# Patient Record
Sex: FEMALE | Race: WHITE | URBAN - METROPOLITAN AREA
[De-identification: names, ages, dates, MRNs, and addresses within clinical notes are randomized per-mention and may not be internally consistent; named-entity substitution may affect disease eponyms.]

---

## 2017-04-01 ENCOUNTER — OFFICE VISIT (OUTPATIENT)
Dept: URGENT CARE | Facility: URGENT CARE | Age: 82
End: 2017-04-01
Payer: MEDICARE

## 2017-04-01 VITALS
TEMPERATURE: 98.4 F | BODY MASS INDEX: 20.72 KG/M2 | SYSTOLIC BLOOD PRESSURE: 120 MMHG | HEART RATE: 84 BPM | DIASTOLIC BLOOD PRESSURE: 60 MMHG | OXYGEN SATURATION: 95 % | HEIGHT: 67 IN | WEIGHT: 132 LBS

## 2017-04-01 DIAGNOSIS — L03.116 CELLULITIS OF LEFT LEG: Primary | ICD-10-CM

## 2017-04-01 PROCEDURE — 99213 OFFICE O/P EST LOW 20 MIN: CPT | Performed by: FAMILY MEDICINE

## 2017-04-01 RX ORDER — CEPHALEXIN 500 MG/1
500 CAPSULE ORAL 3 TIMES DAILY
Qty: 30 CAPSULE | Refills: 0 | Status: SHIPPED | OUTPATIENT
Start: 2017-04-01 | End: 2017-09-16

## 2017-04-01 NOTE — MR AVS SNAPSHOT
"              After Visit Summary   2017    Ena Rubio    MRN: [id number]           Patient Information     Date Of Birth          1935        Visit Information        Provider Department      2017 7:00 PM Trevor Mock MD [facility]        Today's Diagnoses     Cellulitis of left leg    -  1       Follow-ups after your visit        Who to contact     If you have questions or need follow up information about today's clinic visit or your schedule please contact [facility] directly at [phone].  Normal or non-critical lab and imaging results will be communicated to you by MyChart, letter or phone within 4 business days after the clinic has received the results. If you do not hear from us within 7 days, please contact the clinic through [employer] or phone. If you have a critical or abnormal lab result, we will notify you by phone as soon as possible.  Submit refill requests through [employer] or call your pharmacy and they will forward the refill request to us. Please allow 3 business days for your refill to be completed.          Additional Information About Your Visit        MyChart Information     [employer] lets you send messages to your doctor, view your test results, renew your prescriptions, schedule appointments and more. To sign up, go to [email or web address] . Click on \"Log in\" on the left side of the screen, which will take you to the Welcome page. Then click on \"Sign up Now\" on the right side of the page.     You will be asked to enter the access code listed below, as well as some personal information. Please follow the directions to create your username and password.     Your access code is: [email or web address]  Expires: 2017  9:57 AM     Your access code will  in 90 days. If you need help or a new code, please call your [place] clinic or [phone].        Care EveryWhere ID     This is your Care EveryWhere ID. This " "could be used by other organizations to access your [place] medical records  [id number]        Your Vitals Were     Pulse Temperature Height Pulse Oximetry BMI (Body Mass Index)       84 98.4  F (36.9  C) 5' 7\" (1.702 m) 95% 20.67 kg/m2        Blood Pressure from Last 3 Encounters:   04/01/17 120/60   02/02/15 118/72   03/14/14 146/72    Weight from Last 3 Encounters:   04/01/17 132 lb (59.9 kg)   02/02/15 150 lb (68 kg)   03/14/14 156 lb 14.4 oz (71.2 kg)              Today, you had the following     No orders found for display         Today's Medication Changes          These changes are accurate as of: 4/1/17 11:59 PM.  If you have any questions, ask your nurse or doctor.               Start taking these medicines.        Dose/Directions    cephALEXin 500 MG capsule   Commonly known as:  KEFLEX   Used for:  Cellulitis of left leg   Started by:  Trevor Mock MD        Dose:  500 mg   Take 1 capsule (500 mg) by mouth 3 times daily   Quantity:  30 capsule   Refills:  0            Where to get your medicines      These medications were sent to [id number] - [facility] [address] AT [facility] [address]  [address], [facility] [address]    Hours:  24-hours Phone:  [phone]     cephALEXin 500 MG capsule                Primary Care Provider Office Phone # Fax #    Jermaine Guzman [phone]       [facility] [address]  [facility] [address]        Thank you!     Thank you for choosing [place] [facility]  for your care. Our goal is always to provide you with excellent care. Hearing back from our patients is one way we can continue to improve our services. Please take a few minutes to complete the written survey that you may receive in the mail after your visit with us. Thank you!             Your Updated Medication List - Protect others around you: Learn how to safely use, store and throw away your medicines " at www.disposemymeds.org.          This list is accurate as of: 4/1/17 11:59 PM.  Always use your most recent med list.                   Brand Name Dispense Instructions for use    calcium carbonate 600 MG tablet   Generic drug:  calcium      Take 1 tablet by mouth 2 times daily.       cephALEXin 500 MG capsule    KEFLEX    30 capsule    Take 1 capsule (500 mg) by mouth 3 times daily       diclofenac 1 % Gel topical gel    VOLTAREN    100 g    Apply 2-4 grams  to L shoulder  four times daily using enclosed dosing card.       fish oil-omega-3 fatty acids 1000 MG capsule      Take 2 g by mouth daily.       FLEXITOL HEEL BALM Oint     1 Bottle    Externally apply 1 dose topically daily.       gabapentin 600 MG tablet    NEURONTIN     Take 600 mg by mouth 3 times daily.       lisinopril 10 MG tablet    PRINIVIL/ZESTRIL     Take 10 mg by mouth daily.       methadone 5 MG tablet    DOLOPHINE     Take 5 mg by mouth every 6 hours.       mirtazapine 15 MG tablet    REMERON     Take 15 mg by mouth At Bedtime.       morphine sulfate (high concentrate) 20 MG/ML concentrated solution    ROXANOL-CONCENTRATED     Take 1 mL (20 mg) by mouth every 4 hours as needed for moderate to severe pain or other (for breakthrough pain - per [facility] Only)       multivitamin per tablet      Take 1 tablet by mouth daily.       omeprazole 20 MG CR capsule    priLOSEC     Take 20 mg by mouth daily.       oxazepam 10 MG capsule    SERAX     Take 10 mg by mouth nightly as needed.       PRESERVISION AREDS PO      Take  by mouth 2 times daily.       simvastatin 40 MG tablet    ZOCOR     Take 0.5 tablets by mouth at bedtime.       vitamin D 2000 UNITS tablet      Take 1 tablet by mouth daily.

## 2017-04-02 NOTE — NURSING NOTE
"Chief Complaint   Patient presents with     Infection     Pt c/o possible infection in a cut on her left shin X 3 days.     Urgent Care       Initial /60  Pulse 84  Temp 98.4  F (36.9  C)  Ht 5' 7\" (1.702 m)  Wt 132 lb (59.9 kg)  SpO2 95%  BMI 20.67 kg/m2 Estimated body mass index is 20.67 kg/(m^2) as calculated from the following:    Height as of this encounter: 5' 7\" (1.702 m).    Weight as of this encounter: 132 lb (59.9 kg).  Medication Reconciliation: complete    "

## 2017-04-05 NOTE — PROGRESS NOTES
SUBJECTIVE:   Chief Complaint   Patient presents with     Infection     Pt c/o possible infection in a cut on her left shin X 3 days.     Urgent Care     Ena Rubio is a 81 year old female who presents for evaluation of and area of redness, tenderness, swelling and warmth of the skin that developed on the  left, inferior leg.  Patient has had pain and red streaks for 1 week.  Precipitating event was unknown.  Worsening.  Pain is moderate and of a burning quality  Therapies tried: none.    Chronic pain syndrome hx    ALLERGIES:  Compazine      Current Outpatient Prescriptions on File Prior to Visit:  diclofenac (VOLTAREN) 1 % GEL Apply 2-4 grams  to L shoulder  four times daily using enclosed dosing card.   simvastatin (ZOCOR) 40 MG tablet Take 0.5 tablets by mouth at bedtime.   morphine sulfate, high concentrate, (ROXANOL-CONCENTRATED) 20 MG/ML concentrated solution Take 1 mL (20 mg) by mouth every 4 hours as needed for moderate to severe pain or other (for breakthrough pain - per [facility] Only)   omeprazole (PRILOSEC) 20 MG capsule Take 20 mg by mouth daily.   lisinopril (PRINIVIL,ZESTRIL) 10 MG tablet Take 10 mg by mouth daily.   methadone (DOLOPHINE) 5 MG tablet Take 5 mg by mouth every 6 hours.   gabapentin (NEURONTIN) 600 MG tablet Take 600 mg by mouth 3 times daily.   mirtazapine (REMERON) 15 MG tablet Take 15 mg by mouth At Bedtime.   Multiple Vitamins-Minerals (PRESERVISION AREDS PO) Take  by mouth 2 times daily.   fish oil-omega-3 fatty acids (OMEGA 3) 1000 MG capsule Take 2 g by mouth daily.   Multiple Vitamin (MULTIVITAMIN) per tablet Take 1 tablet by mouth daily.   oxazepam (SERAX) 10 MG capsule Take 10 mg by mouth nightly as needed.   calcium (CALCIUM 600) 600 MG tablet Take 1 tablet by mouth 2 times daily.   Cholecalciferol (VITAMIN D) 2000 UNIT tablet Take 1 tablet by mouth daily.   Podiatric Products (FLEXITOL HEEL BALM) OINT Externally apply 1 dose topically daily.     No current  "facility-administered medications on file prior to visit.     Social History   Substance Use Topics     Smoking status: Never Smoker     Smokeless tobacco: Never Used     Alcohol use No       No family history on file.    ROS:  Review of systems negative except as stated above.    OBJECTIVE:  /60  Pulse 84  Temp 98.4  F (36.9  C)  Ht 5' 7\" (1.702 m)  Wt 132 lb (59.9 kg)  SpO2 95%  BMI 20.67 kg/m2    Skin area involved is 10 cm by 10 cm on the left,  inferior leg.   The skin appear erythematous, moderately swollen, with tenderness and warmth to the touch.  GENERAL APPEARANCE: healthy, alert and no distress  EYES: EOMI,  PERRL, conjunctiva clear  NECK: supple, non-tender to palpation, no adenopathy noted  RESP: lungs clear to auscultation - no rales, rhonchi or wheezes  CV: regular rates and rhythm, normal S1 S2, no murmur noted   Neuro: sensation intact, reflexes intact    ASSESSMENT:  Cellulitis of left leg    - cephALEXin (KEFLEX) 500 MG capsule; Take 1 capsule (500 mg) by mouth 3 times daily    Patient to monitor for signs or symptoms of worsening/ spreading infection.  Go to Urgent care or Emergency Department if worsening fevers/chills and/or spreading infection.  Warm, moist packs or towels can be applied to the region to aid in resolution of the infection.  Use Tylenol or ibuprofen for pain or fever.       "

## 2017-07-22 ENCOUNTER — OFFICE VISIT (OUTPATIENT)
Dept: URGENT CARE | Facility: URGENT CARE | Age: 82
End: 2017-07-22
Payer: MEDICARE

## 2017-07-22 VITALS — OXYGEN SATURATION: 93 % | HEART RATE: 92 BPM | DIASTOLIC BLOOD PRESSURE: 72 MMHG | SYSTOLIC BLOOD PRESSURE: 161 MMHG

## 2017-07-22 DIAGNOSIS — M79.89 LEFT LEG SWELLING: Primary | ICD-10-CM

## 2017-07-22 PROCEDURE — 99213 OFFICE O/P EST LOW 20 MIN: CPT | Performed by: HOSPITALIST

## 2017-07-22 NOTE — NURSING NOTE
"Chief Complaint   Patient presents with     Swelling     pt. states that she notice her left leg swelling 1x weeks ago. pt. denies any any injury to the leg. Pt. also complains of cold-like symtoms.       Initial /72 (BP Location: Left arm, Patient Position: Chair, Cuff Size: Adult Regular)  Pulse 92  SpO2 93% Estimated body mass index is 20.67 kg/(m^2) as calculated from the following:    Height as of 4/1/17: 5' 7\" (1.702 m).    Weight as of 4/1/17: 132 lb (59.9 kg).  Medication Reconciliation: complete    "

## 2017-07-22 NOTE — MR AVS SNAPSHOT
"              After Visit Summary   7/22/2017    Ena Rubio    MRN: [id number]           Patient Information     Date Of Birth          1935        Visit Information        Provider Department      7/22/2017 4:40 PM Hal Smith MD [facility]        Care Instructions    I am worry if she has DVT on the left leg  I do noted Wan sign on the left calf,   There is also noted wound on the lateral side of the ankle.   She has history of of osteomyelitis as per her   Might need work up with xray and blood culture too.  I think it will be more appropriate if she is got evaluated in the ER for her medical issue   understand and agreeable with this plan, all question answered.             Follow-ups after your visit        Who to contact     If you have questions or need follow up information about today's clinic visit or your schedule please contact [facility] directly at [phone].  Normal or non-critical lab and imaging results will be communicated to you by MyChart, letter or phone within 4 business days after the clinic has received the results. If you do not hear from us within 7 days, please contact the clinic through [employer] or phone. If you have a critical or abnormal lab result, we will notify you by phone as soon as possible.  Submit refill requests through [employer] or call your pharmacy and they will forward the refill request to us. Please allow 3 business days for your refill to be completed.          Additional Information About Your Visit        [employer] Information     [employer] lets you send messages to your doctor, view your test results, renew your prescriptions, schedule appointments and more. To sign up, go to [email or web address] . Click on \"Log in\" on the left side of the screen, which will take you to the Welcome page. Then click on \"Sign up Now\" on the right side of the page.     You will be asked to " enter the access code listed below, as well as some personal information. Please follow the directions to create your username and password.     Your access code is: [email or web address]  Expires: 10/20/2017  5:31 PM     Your access code will  in 90 days. If you need help or a new code, please call your [place] clinic or [phone].        Care EveryWhere ID     This is your Care EveryWhere ID. This could be used by other organizations to access your [place] medical records  [id number]        Your Vitals Were     Pulse Pulse Oximetry                92 93%           Blood Pressure from Last 3 Encounters:   17 161/72   17 120/60   02/02/15 118/72    Weight from Last 3 Encounters:   17 132 lb (59.9 kg)   02/02/15 150 lb (68 kg)   14 156 lb 14.4 oz (71.2 kg)              Today, you had the following     No orders found for display       Primary Care Provider Office Phone # Fax #    Jermaine Guzman [phone]       [facility] [address]  [facility] [address]        Equal Access to Services     : Hadii aad ku hadasho Soomaali, waaxda luqadaha, qaybta kaalmada adeegyada, waxay santain haybuzzn joelle noble . So [facility] [phone].    ATENCIÓN: Si habla español, tiene a uha disposición servicios gratuitos de asistencia lingüística. [facility] [phone].    We comply with applicable federal civil rights laws and [place] laws. We do not discriminate on the basis of race, color, national origin, age, disability sex, sexual orientation or gender identity.            Thank you!     Thank you for choosing [place] [facility]  for your care. Our goal is always to provide you with excellent care. Hearing back from our patients is one way we can continue to improve our services. Please take a few minutes to complete the written survey that you may receive in the mail after your visit with us. Thank you!             Your Updated  Medication List - Protect others around you: Learn how to safely use, store and throw away your medicines at www.disposemymeds.org.          This list is accurate as of: 7/22/17  5:31 PM.  Always use your most recent med list.                   Brand Name Dispense Instructions for use Diagnosis    calcium carbonate 600 MG tablet   Generic drug:  calcium      Take 1 tablet by mouth 2 times daily.    Other specified disorder of skin, Other specified viral warts       cephALEXin 500 MG capsule    KEFLEX    30 capsule    Take 1 capsule (500 mg) by mouth 3 times daily    Cellulitis of left leg       diclofenac 1 % Gel topical gel    VOLTAREN    100 g    Apply 2-4 grams  to L shoulder  four times daily using enclosed dosing card.    Adhesive capsulitis of shoulder, left       fish oil-omega-3 fatty acids 1000 MG capsule      Take 2 g by mouth daily.    Other specified disorder of skin, Other specified viral warts       FLEXITOL HEEL BALM Oint     1 Bottle    Externally apply 1 dose topically daily.    Other specified disorder of skin, Other specified viral warts       gabapentin 600 MG tablet    NEURONTIN     Take 600 mg by mouth 3 times daily.    Other specified disorder of skin, Other specified viral warts       lisinopril 10 MG tablet    PRINIVIL/ZESTRIL     Take 10 mg by mouth daily.    Other specified disorder of skin, Other specified viral warts       methadone 5 MG tablet    DOLOPHINE     Take 5 mg by mouth every 6 hours.    Other specified disorder of skin, Other specified viral warts       mirtazapine 15 MG tablet    REMERON     Take 15 mg by mouth At Bedtime.    Other specified disorder of skin, Other specified viral warts       morphine sulfate (high concentrate) 20 MG/ML concentrated solution    ROXANOL-CONCENTRATED     Take 1 mL (20 mg) by mouth every 4 hours as needed for moderate to severe pain or other (for breakthrough pain - per [facility] Only)    Chronic pain syndrome       multivitamin per  tablet      Take 1 tablet by mouth daily.    Other specified disorder of skin, Other specified viral warts       omeprazole 20 MG CR capsule    priLOSEC     Take 20 mg by mouth daily.    Other specified disorder of skin, Other specified viral warts       oxazepam 10 MG capsule    SERAX     Take 10 mg by mouth nightly as needed.    Other specified disorder of skin, Other specified viral warts       PRESERVISION AREDS PO      Take  by mouth 2 times daily.    Other specified disorder of skin, Other specified viral warts       simvastatin 40 MG tablet    ZOCOR     Take 0.5 tablets by mouth at bedtime.        vitamin D 2000 UNITS tablet      Take 1 tablet by mouth daily.    Other specified disorder of skin, Other specified viral warts

## 2017-07-22 NOTE — PROGRESS NOTES
Pt came here for swelling on the left ankle. Apparently as per  pt has history of osteomyelitis on her left foot. She has surgery for that. It was done on [facility] before. He notice the swelling has been going on for the last 1-2 weeks. No fever or chill.  also noted that there is some blister that broken and start oozing.       Allergies   Allergen Reactions     Compazine Anaphylaxis     Stopped breathing/seizure       No past medical history on file.      Current Outpatient Prescriptions on File Prior to Visit:  cephALEXin (KEFLEX) 500 MG capsule Take 1 capsule (500 mg) by mouth 3 times daily   diclofenac (VOLTAREN) 1 % GEL Apply 2-4 grams  to L shoulder  four times daily using enclosed dosing card.   simvastatin (ZOCOR) 40 MG tablet Take 0.5 tablets by mouth at bedtime.   morphine sulfate, high concentrate, (ROXANOL-CONCENTRATED) 20 MG/ML concentrated solution Take 1 mL (20 mg) by mouth every 4 hours as needed for moderate to severe pain or other (for breakthrough pain - per [facility] Only)   omeprazole (PRILOSEC) 20 MG capsule Take 20 mg by mouth daily.   lisinopril (PRINIVIL,ZESTRIL) 10 MG tablet Take 10 mg by mouth daily.   methadone (DOLOPHINE) 5 MG tablet Take 5 mg by mouth every 6 hours.   gabapentin (NEURONTIN) 600 MG tablet Take 600 mg by mouth 3 times daily.   mirtazapine (REMERON) 15 MG tablet Take 15 mg by mouth At Bedtime.   Multiple Vitamins-Minerals (PRESERVISION AREDS PO) Take  by mouth 2 times daily.   fish oil-omega-3 fatty acids (OMEGA 3) 1000 MG capsule Take 2 g by mouth daily.   Multiple Vitamin (MULTIVITAMIN) per tablet Take 1 tablet by mouth daily.   oxazepam (SERAX) 10 MG capsule Take 10 mg by mouth nightly as needed.   calcium (CALCIUM 600) 600 MG tablet Take 1 tablet by mouth 2 times daily.   Cholecalciferol (VITAMIN D) 2000 UNIT tablet Take 1 tablet by mouth daily.   Podiatric Products (FLEXITOL HEEL BALM) OINT Externally apply 1 dose topically daily.      No current facility-administered medications on file prior to visit.     Social History   Substance Use Topics     Smoking status: Never Smoker     Smokeless tobacco: Never Used     Alcohol use No       ROS:  Consitutional: As above  ENT: As above  Respiratory: As above    OBJECTIVE:  /72 (BP Location: Left arm, Patient Position: Chair, Cuff Size: Adult Regular)  Pulse 92  SpO2 93%  GENERAL APPEARANCE: ill appearance, alert and mild to moderate  distressopathy  RESP: lungs clear to auscultation - no rales, rhonchi or wheezes  CV: regular rates and rhythm, normal S1 S2, no murmur noted  EXTREMITY: on the left foot, I do noted the swelling on the left ankle and slightly to the calf area, tenderness noted on the compression of her calf. I also noted the broken blister on the lateral side of her ankle, clear drainage is noted   NEURO: awake, alert        No results found for this or any previous visit (from the past 168 hour(s)).     ASSESSMENT:     ICD-10-CM    1. Left leg swelling M79.89          PLAN:    I am worry if she has DVT on the left leg  I do noted Awn sign on the left calf,   There is also noted wound on the lateral side of the ankle.   She has history of of osteomyelitis as per her   Might need work up with xray and blood culture too.  I think it will be more appropriate if she is got evaluated in the ER for her medical issue   understand and agreeable with this plan, all question answered. As per her  he will bring her to [facility] where she has her surgery initially in the past.     Hal Smith MD

## 2017-07-22 NOTE — PATIENT INSTRUCTIONS
I am worry if she has DVT on the left leg  I do noted Wan sign on the left calf,   There is also noted wound on the lateral side of the ankle.   She has history of of osteomyelitis as per her   Might need work up with xray and blood culture too.  I think it will be more appropriate if she is got evaluated in the ER for her medical issue   understand and agreeable with this plan, all question answered.

## 2017-09-16 ENCOUNTER — OFFICE VISIT (OUTPATIENT)
Dept: URGENT CARE | Facility: URGENT CARE | Age: 82
End: 2017-09-16
Payer: MEDICARE

## 2017-09-16 VITALS
TEMPERATURE: 98.1 F | DIASTOLIC BLOOD PRESSURE: 80 MMHG | OXYGEN SATURATION: 97 % | WEIGHT: 130 LBS | BODY MASS INDEX: 20.36 KG/M2 | HEART RATE: 87 BPM | SYSTOLIC BLOOD PRESSURE: 150 MMHG

## 2017-09-16 DIAGNOSIS — R30.0 DYSURIA: ICD-10-CM

## 2017-09-16 DIAGNOSIS — N39.0 ACUTE UTI: Primary | ICD-10-CM

## 2017-09-16 LAB
ALBUMIN UR-MCNC: 100 MG/DL
APPEARANCE UR: CLEAR
BACTERIA #/AREA URNS HPF: ABNORMAL /HPF
BILIRUB UR QL STRIP: ABNORMAL
COLOR UR AUTO: YELLOW
GLUCOSE UR STRIP-MCNC: NEGATIVE MG/DL
HGB UR QL STRIP: ABNORMAL
KETONES UR STRIP-MCNC: NEGATIVE MG/DL
LEUKOCYTE ESTERASE UR QL STRIP: ABNORMAL
NITRATE UR QL: POSITIVE
PH UR STRIP: 6.5 PH (ref 5–7)
RBC #/AREA URNS AUTO: ABNORMAL /HPF
SOURCE: ABNORMAL
SP GR UR STRIP: <=1.005 (ref 1–1.03)
TRANS CELLS #/AREA URNS HPF: ABNORMAL /HPF
UROBILINOGEN UR STRIP-ACNC: 1 EU/DL (ref 0.2–1)
WBC #/AREA URNS AUTO: ABNORMAL /HPF

## 2017-09-16 PROCEDURE — 81001 URINALYSIS AUTO W/SCOPE: CPT | Performed by: PHYSICIAN ASSISTANT

## 2017-09-16 PROCEDURE — 87086 URINE CULTURE/COLONY COUNT: CPT | Performed by: PHYSICIAN ASSISTANT

## 2017-09-16 PROCEDURE — 99213 OFFICE O/P EST LOW 20 MIN: CPT | Performed by: PHYSICIAN ASSISTANT

## 2017-09-16 RX ORDER — CEFDINIR 300 MG/1
300 CAPSULE ORAL 2 TIMES DAILY
Qty: 20 CAPSULE | Refills: 0 | Status: SHIPPED | OUTPATIENT
Start: 2017-09-16 | End: 2017-11-20

## 2017-09-16 RX ORDER — PHENAZOPYRIDINE HYDROCHLORIDE 100 MG/1
100 TABLET, FILM COATED ORAL 3 TIMES DAILY PRN
Qty: 12 TABLET | Refills: 0 | Status: SHIPPED | OUTPATIENT
Start: 2017-09-16 | End: 2017-11-20

## 2017-09-16 NOTE — MR AVS SNAPSHOT
"              After Visit Summary   9/16/2017    Ena Rubio    MRN: [id number]           Patient Information     Date Of Birth          1935        Visit Information        Provider Department      9/16/2017 6:05 PM Pooja Vela PA-C [facility]        Today's Diagnoses     Acute UTI    -  1    Dysuria          Care Instructions    (N39.0) Acute UTI  (primary encounter diagnosis)  Comment:   Plan: cefdinir (OMNICEF) 300 MG capsule,         phenazopyridine (PYRIDIUM) 100 MG tablet            (R30.0) Dysuria  Comment:   Plan: UA with Microscopic reflex to Culture, Urine         Culture Aerobic Bacterial                        Follow-ups after your visit        Who to contact     If you have questions or need follow up information about today's clinic visit or your schedule please contact [facility] directly at [phone].  Normal or non-critical lab and imaging results will be communicated to you by [employer], letter or phone within 4 business days after the clinic has received the results. If you do not hear from us within 7 days, please contact the clinic through [employer] or phone. If you have a critical or abnormal lab result, we will notify you by phone as soon as possible.  Submit refill requests through [employer] or call your pharmacy and they will forward the refill request to us. Please allow 3 business days for your refill to be completed.          Additional Information About Your Visit        MyChart Information     [employer] lets you send messages to your doctor, view your test results, renew your prescriptions, schedule appointments and more. To sign up, go to [email or web address] . Click on \"Log in\" on the left side of the screen, which will take you to the Welcome page. Then click on \"Sign up Now\" on the right side of the page.     You will be asked to enter the access code listed below, as well as some personal " information. Please follow the directions to create your username and password.     Your access code is: [email or web address]  Expires: 10/20/2017  5:31 PM     Your access code will  in 90 days. If you need help or a new code, please call your [facility] clinic or [phone].        Care EveryWhere ID     This is your Care EveryWhere ID. This could be used by other organizations to access your [facility] medical records  [id number]        Your Vitals Were     Pulse Temperature Pulse Oximetry BMI (Body Mass Index)          87 98.1  F (36.7  C) 97% 20.36 kg/m2         Blood Pressure from Last 3 Encounters:   17 150/80   17 161/72   17 120/60    Weight from Last 3 Encounters:   17 130 lb (59 kg)   17 132 lb (59.9 kg)   02/02/15 150 lb (68 kg)              We Performed the Following     UA with Microscopic reflex to Culture     Urine Culture Aerobic Bacterial          Today's Medication Changes          These changes are accurate as of: 17  7:21 PM.  If you have any questions, ask your nurse or doctor.               Start taking these medicines.        Dose/Directions    cefdinir 300 MG capsule   Commonly known as:  OMNICEF   Used for:  Acute UTI   Started by:  Pooja Vela PA-C        Dose:  300 mg   Take 1 capsule (300 mg) by mouth 2 times daily   Quantity:  20 capsule   Refills:  0       phenazopyridine 100 MG tablet   Commonly known as:  PYRIDIUM   Used for:  Acute UTI   Started by:  Pooja Vela PA-C        Dose:  100 mg   Take 1 tablet (100 mg) by mouth 3 times daily as needed   Quantity:  12 tablet   Refills:  0         These medicines have changed or have updated prescriptions.        Dose/Directions    diclofenac 1 % Gel topical gel   Commonly known as:  VOLTAREN   This may have changed:    - when to take this  - reasons to take this  - additional instructions   Used for:  Adhesive capsulitis of shoulder, left        Apply 2-4 grams  to L shoulder   four times daily using enclosed dosing card.   Quantity:  100 g   Refills:  1            Where to get your medicines      These medications were sent to [id number] - [place], [place] - [address] AT [facility] [address], [facility] [address]    Hours:  24-hours Phone:  [phone]     cefdinir 300 MG capsule    phenazopyridine 100 MG tablet                Primary Care Provider Office Phone # Fax #    Jermaine Guzman [phone]       [facility] [address]  [facility] [address]        Equal Access to Services     [facility] Hospital[facility]IMITRIS : Hadii aad ku hadasho Soomaali, waaxda luqadaha, qaybta kaalmada adeegyada, waxay idiin hayaan adeeg kharatushar noble . So [facility] [phone].    ATENCIÓN: Si habla español, tiene a hua disposición servicios gratuitos de asistencia lingüística. Llame al [phone].    We comply with applicable federal civil rights laws and [place] laws. We do not discriminate on the basis of race, color, national origin, age, disability sex, sexual orientation or gender identity.            Thank you!     Thank you for choosing [facility]  for your care. Our goal is always to provide you with excellent care. Hearing back from our patients is one way we can continue to improve our services. Please take a few minutes to complete the written survey that you may receive in the mail after your visit with us. Thank you!             Your Updated Medication List - Protect others around you: Learn how to safely use, store and throw away your medicines at www.disposemymeds.org.          This list is accurate as of: 9/16/17  7:21 PM.  Always use your most recent med list.                   Brand Name Dispense Instructions for use Diagnosis    calcium carbonate 600 MG tablet   Generic drug:  calcium      Take 1 tablet by mouth 2 times daily.    Other specified disorder of skin, Other specified viral warts        cefdinir 300 MG capsule    OMNICEF    20 capsule    Take 1 capsule (300 mg) by mouth 2 times daily    Acute UTI       diclofenac 1 % Gel topical gel    VOLTAREN    100 g    Apply 2-4 grams  to L shoulder  four times daily using enclosed dosing card.    Adhesive capsulitis of shoulder, left       fish oil-omega-3 fatty acids 1000 MG capsule      Take 2 g by mouth daily.    Other specified disorder of skin, Other specified viral warts       FLEXITOL HEEL BALM Oint     1 Bottle    Externally apply 1 dose topically daily.    Other specified disorder of skin, Other specified viral warts       gabapentin 600 MG tablet    NEURONTIN     Take 600 mg by mouth 3 times daily.    Other specified disorder of skin, Other specified viral warts       lisinopril 10 MG tablet    PRINIVIL/ZESTRIL     Take 10 mg by mouth daily.    Other specified disorder of skin, Other specified viral warts       methadone 5 MG tablet    DOLOPHINE     Take 5 mg by mouth every 6 hours.    Other specified disorder of skin, Other specified viral warts       mirtazapine 15 MG tablet    REMERON     Take 15 mg by mouth At Bedtime.    Other specified disorder of skin, Other specified viral warts       morphine sulfate (high concentrate) 20 MG/ML concentrated solution    ROXANOL-CONCENTRATED     Take 1 mL (20 mg) by mouth every 4 hours as needed for moderate to severe pain or other (for breakthrough pain - per [facility] Only)    Chronic pain syndrome       multivitamin per tablet      Take 1 tablet by mouth daily.    Other specified disorder of skin, Other specified viral warts       omeprazole 20 MG CR capsule    priLOSEC     Take 20 mg by mouth daily.    Other specified disorder of skin, Other specified viral warts       oxazepam 10 MG capsule    SERAX     Take 10 mg by mouth nightly as needed.    Other specified disorder of skin, Other specified viral warts       phenazopyridine 100 MG tablet    PYRIDIUM    12 tablet    Take 1 tablet (100 mg) by  mouth 3 times daily as needed    Acute UTI       PRESERVISION AREDS PO      Take  by mouth 2 times daily.    Other specified disorder of skin, Other specified viral warts       simvastatin 40 MG tablet    ZOCOR     Take 0.5 tablets by mouth at bedtime.        vitamin D 2000 UNITS tablet      Take 1 tablet by mouth daily.    Other specified disorder of skin, Other specified viral warts

## 2017-09-17 NOTE — PATIENT INSTRUCTIONS
(N39.0) Acute UTI  (primary encounter diagnosis)  Comment:   Plan: cefdinir (OMNICEF) 300 MG capsule,         phenazopyridine (PYRIDIUM) 100 MG tablet            (R30.0) Dysuria  Comment:   Plan: UA with Microscopic reflex to Culture, Urine         Culture Aerobic Bacterial

## 2017-09-17 NOTE — PROGRESS NOTES
SUBJECTIVE:   Ena Rubio is a 81 year old female who  presents today for a possible UTI. Symptoms of frequency have been going on since this morning.  Hematuria yes.  sudden onsetand moderate.  There is no history of fever, chills, nausea or vomiting.  No history of vaginal discharge. This patient does  have a history of urinary tract infections. Patient denies long duration, rigors, flank pain, temperature > 101 degrees F. and Vomiting, significant nausea or diarrhea.     No past medical history on file.  Patient Active Problem List   Diagnosis     Symptoms involving cardiovascular system     Chronic pain syndrome     Esophagitis     Coronary atherosclerosis     Sedative, hypnotic or anxiolytic dependence     Essential hypertension     Hereditary and idiopathic peripheral neuropathy     Rectocele     Persistent insomnia     Social History   Substance Use Topics     Smoking status: Never Smoker     Smokeless tobacco: Never Used     Alcohol use No       ROS:   CONSTITUTIONAL:NEGATIVE for fever, chills, change in weight  INTEGUMENTARY/SKIN: NEGATIVE for worrisome rashes, moles or lesions  : as per HPI    OBJECTIVE:  /80  Pulse 87  Temp 98.1  F (36.7  C)  Wt 130 lb (59 kg)  SpO2 97%  BMI 20.36 kg/m2  GENERAL APPEARANCE: healthy, alert and no distress  ABDOMEN:  soft, nontender, no HSM or masses and bowel sounds normal  BACK: No CVA tenderness  SKIN: no suspicious lesions or rashes    ASSESSMENT:   Lower, uncomplicated urinary tract infection.    PLAN:  OMNICEF  PYRIDIUM  As per ordered above  Drink plenty of fluids.  Prevention and treatment of UTI's discussed.Signs and symptoms of pyelonephritis mentioned.  Follow up with primary care physician if not improving

## 2017-09-17 NOTE — NURSING NOTE
"Chief Complaint   Patient presents with     Dysuria     Pt c/o possible UTI sxs with dysuria and hematuria which started this morning.     Urgent Care       Initial /80  Pulse 87  Temp 98.1  F (36.7  C)  Wt 130 lb (59 kg)  SpO2 97%  BMI 20.36 kg/m2 Estimated body mass index is 20.36 kg/(m^2) as calculated from the following:    Height as of 4/1/17: 5' 7\" (1.702 m).    Weight as of this encounter: 130 lb (59 kg).  Medication Reconciliation: complete    "

## 2017-09-18 LAB
BACTERIA SPEC CULT: NORMAL
BACTERIA SPEC CULT: NORMAL
SPECIMEN SOURCE: NORMAL

## 2017-09-19 ENCOUNTER — TELEPHONE (OUTPATIENT)
Dept: URGENT CARE | Facility: URGENT CARE | Age: 82
End: 2017-09-19

## 2017-09-19 NOTE — TELEPHONE ENCOUNTER
Lab notified Urgent Care that this pt's urine culture was not done even though it was ordered. The lab missed it. Pooja Bonilla, PAC informed. Pooja requests that pt is notified and should be instructed to follow up with her PCP. Left message with pt's  for pt to return call.

## 2017-09-20 NOTE — TELEPHONE ENCOUNTER
Patient has been notified of information below and will follow up with her PCP/Clinic.     NAOMI Perales MA

## 2017-11-08 ENCOUNTER — APPOINTMENT (OUTPATIENT)
Dept: GENERAL RADIOLOGY | Facility: CLINIC | Age: 82
End: 2017-11-08
Attending: EMERGENCY MEDICINE
Payer: MEDICARE

## 2017-11-08 ENCOUNTER — HOSPITAL ENCOUNTER (EMERGENCY)
Facility: CLINIC | Age: 82
Discharge: HOME | End: 2017-11-08
Attending: EMERGENCY MEDICINE | Admitting: EMERGENCY MEDICINE
Payer: MEDICARE

## 2017-11-08 ENCOUNTER — APPOINTMENT (OUTPATIENT)
Dept: CT IMAGING | Facility: CLINIC | Age: 82
End: 2017-11-08
Attending: EMERGENCY MEDICINE
Payer: MEDICARE

## 2017-11-08 VITALS
WEIGHT: 130 LBS | OXYGEN SATURATION: 97 % | SYSTOLIC BLOOD PRESSURE: 164 MMHG | RESPIRATION RATE: 14 BRPM | DIASTOLIC BLOOD PRESSURE: 80 MMHG | HEART RATE: 96 BPM | HEIGHT: 67 IN | TEMPERATURE: 98.7 F | BODY MASS INDEX: 20.4 KG/M2

## 2017-11-08 DIAGNOSIS — R41.82 ALTERED MENTAL STATUS, UNSPECIFIED ALTERED MENTAL STATUS TYPE: ICD-10-CM

## 2017-11-08 DIAGNOSIS — J18.9 PNEUMONIA OF RIGHT MIDDLE LOBE DUE TO INFECTIOUS ORGANISM: ICD-10-CM

## 2017-11-08 LAB
ALBUMIN UR-MCNC: 10 MG/DL
ANION GAP SERPL CALCULATED.3IONS-SCNC: 4 MMOL/L (ref 3–14)
APPEARANCE UR: CLEAR
BASOPHILS # BLD AUTO: 0 10E9/L (ref 0–0.2)
BASOPHILS NFR BLD AUTO: 0.3 %
BILIRUB UR QL STRIP: NEGATIVE
BUN SERPL-MCNC: 10 MG/DL (ref 7–30)
CALCIUM SERPL-MCNC: 8.7 MG/DL (ref 8.5–10.1)
CHLORIDE SERPL-SCNC: 104 MMOL/L (ref 94–109)
CO2 SERPL-SCNC: 31 MMOL/L (ref 20–32)
COLOR UR AUTO: YELLOW
CREAT SERPL-MCNC: 0.65 MG/DL (ref 0.52–1.04)
DIFFERENTIAL METHOD BLD: ABNORMAL
EOSINOPHIL # BLD AUTO: 0.1 10E9/L (ref 0–0.7)
EOSINOPHIL NFR BLD AUTO: 1.4 %
ERYTHROCYTE [DISTWIDTH] IN BLOOD BY AUTOMATED COUNT: 15.2 % (ref 10–15)
GFR SERPL CREATININE-BSD FRML MDRD: 88 ML/MIN/1.7M2
GLUCOSE SERPL-MCNC: 84 MG/DL (ref 70–99)
GLUCOSE UR STRIP-MCNC: NEGATIVE MG/DL
HCT VFR BLD AUTO: 40.4 % (ref 35–47)
HGB BLD-MCNC: 13.1 G/DL (ref 11.7–15.7)
HGB UR QL STRIP: NEGATIVE
IMM GRANULOCYTES # BLD: 0 10E9/L (ref 0–0.4)
IMM GRANULOCYTES NFR BLD: 0.1 %
KETONES UR STRIP-MCNC: NEGATIVE MG/DL
LEUKOCYTE ESTERASE UR QL STRIP: ABNORMAL
LYMPHOCYTES # BLD AUTO: 1.2 10E9/L (ref 0.8–5.3)
LYMPHOCYTES NFR BLD AUTO: 16.1 %
MCH RBC QN AUTO: 28.5 PG (ref 26.5–33)
MCHC RBC AUTO-ENTMCNC: 32.4 G/DL (ref 31.5–36.5)
MCV RBC AUTO: 88 FL (ref 78–100)
MONOCYTES # BLD AUTO: 1 10E9/L (ref 0–1.3)
MONOCYTES NFR BLD AUTO: 13.9 %
MUCOUS THREADS #/AREA URNS LPF: PRESENT /LPF
NEUTROPHILS # BLD AUTO: 5 10E9/L (ref 1.6–8.3)
NEUTROPHILS NFR BLD AUTO: 68.2 %
NITRATE UR QL: NEGATIVE
NRBC # BLD AUTO: 0 10*3/UL
NRBC BLD AUTO-RTO: 0 /100
PH UR STRIP: 6 PH (ref 5–7)
PLATELET # BLD AUTO: 241 10E9/L (ref 150–450)
POTASSIUM SERPL-SCNC: 3.8 MMOL/L (ref 3.4–5.3)
RBC # BLD AUTO: 4.6 10E12/L (ref 3.8–5.2)
RBC #/AREA URNS AUTO: <1 /HPF (ref 0–2)
SODIUM SERPL-SCNC: 139 MMOL/L (ref 133–144)
SOURCE: ABNORMAL
SP GR UR STRIP: 1.02 (ref 1–1.03)
UROBILINOGEN UR STRIP-MCNC: 2 MG/DL (ref 0–2)
WBC # BLD AUTO: 7.3 10E9/L (ref 4–11)
WBC #/AREA URNS AUTO: 3 /HPF (ref 0–2)

## 2017-11-08 PROCEDURE — 80048 BASIC METABOLIC PNL TOTAL CA: CPT | Performed by: EMERGENCY MEDICINE

## 2017-11-08 PROCEDURE — 25000132 ZZH RX MED GY IP 250 OP 250 PS 637: Mod: GY | Performed by: EMERGENCY MEDICINE

## 2017-11-08 PROCEDURE — 71020 XR CHEST 2 VW: CPT

## 2017-11-08 PROCEDURE — 85025 COMPLETE CBC W/AUTO DIFF WBC: CPT | Performed by: EMERGENCY MEDICINE

## 2017-11-08 PROCEDURE — 99285 EMERGENCY DEPT VISIT HI MDM: CPT | Mod: 25

## 2017-11-08 PROCEDURE — 87086 URINE CULTURE/COLONY COUNT: CPT | Performed by: EMERGENCY MEDICINE

## 2017-11-08 PROCEDURE — 93005 ELECTROCARDIOGRAM TRACING: CPT

## 2017-11-08 PROCEDURE — A9270 NON-COVERED ITEM OR SERVICE: HCPCS | Mod: GY | Performed by: EMERGENCY MEDICINE

## 2017-11-08 PROCEDURE — 70450 CT HEAD/BRAIN W/O DYE: CPT

## 2017-11-08 PROCEDURE — 81001 URINALYSIS AUTO W/SCOPE: CPT | Performed by: EMERGENCY MEDICINE

## 2017-11-08 RX ORDER — NALOXONE HYDROCHLORIDE 0.4 MG/ML
0.4 INJECTION, SOLUTION INTRAMUSCULAR; INTRAVENOUS; SUBCUTANEOUS ONCE
Status: DISCONTINUED | OUTPATIENT
Start: 2017-11-08 | End: 2017-11-08

## 2017-11-08 RX ORDER — DOXYCYCLINE 100 MG/1
100 CAPSULE ORAL ONCE
Status: COMPLETED | OUTPATIENT
Start: 2017-11-08 | End: 2017-11-08

## 2017-11-08 RX ORDER — AZITHROMYCIN 250 MG/1
500 TABLET, FILM COATED ORAL ONCE
Status: DISCONTINUED | OUTPATIENT
Start: 2017-11-08 | End: 2017-11-08

## 2017-11-08 RX ORDER — ONDANSETRON 2 MG/ML
4 INJECTION INTRAMUSCULAR; INTRAVENOUS ONCE
Status: DISCONTINUED | OUTPATIENT
Start: 2017-11-08 | End: 2017-11-09 | Stop reason: HOSPADM

## 2017-11-08 RX ORDER — DOXYCYCLINE 100 MG/1
100 CAPSULE ORAL 2 TIMES DAILY
Qty: 20 CAPSULE | Refills: 0 | Status: SHIPPED | OUTPATIENT
Start: 2017-11-09 | End: 2017-11-19

## 2017-11-08 RX ADMIN — DOXYCYCLINE HYCLATE 100 MG: 100 CAPSULE, GELATIN COATED ORAL at 21:31

## 2017-11-08 NOTE — ED AVS SNAPSHOT
Emergency Department    [address] [place] [address]    Phone:  [phone]    Fax:  [phone]                                       Ena Rubio   MRN: [id number]    Department:   Emergency Department   Date of Visit:  11/8/2017           After Visit Summary Signature Page     I have received my discharge instructions, and my questions have been answered. I have discussed any challenges I see with this plan with the nurse or doctor.    ..........................................................................................................................................  Patient/Patient Representative Signature      ..........................................................................................................................................  Patient Representative Print Name and Relationship to Patient    ..................................................               ................................................  Date                                            Time    ..........................................................................................................................................  Reviewed by Signature/Title    ...................................................              ..............................................  Date                                                            Time

## 2017-11-08 NOTE — ED AVS SNAPSHOT
Emergency Department    [address] [facility] [address]    Phone:  [phone]    Fax:  [phone]                                       Ena Rubio   MRN: [id number]    Department:   Emergency Department   Date of Visit:  11/8/2017           Patient Information     Date Of Birth          1935        Your diagnoses for this visit were:     Altered mental status, unspecified altered mental status type     Pneumonia of right middle lobe due to infectious organism (H)        You were seen by Augusto Jones MD.      Follow-up Information     Please follow up.    Why:  continue Doxycycline - return if any concerns        Discharge Instructions         Confusion  Confusion is a change in a person s ability to think clearly. There may be trouble recognizing familiar people and places or knowing what day it is. Memory, judgment, and decision-making may also be affected. In severe cases, the person may have limited or no response to being spoken to.  Confusion is usually a sign of an underlying problem. It may occur suddenly. Or it may develop gradually over time. Causes of confusion include brain injury, medicines, alcohol, withdrawal from certain medicines or illegal drugs, and infection. Heart attack and stroke may cause it. Confusion can also be a sign of dementia or a mental illness.  Treatment will depend on the cause of the problem. If the issue is a medicine, stopping the medicine may help. The healthcare provider will perform an evaluation and certain tests may be done. Follow up with the healthcare provider for the results.  Home care    Be sure someone is with the confused person at all times. He or she should not be left alone or unsupervised.    Tell the healthcare provider about all medicines that the person takes. These include prescription, over-the-counter, herbs, and supplements.    Dehydration can increase confusion. Ask the healthcare provider how much fluid the  person should be drinking. Offer liquids and ensure that they are taken.    Keep all medicines in a secure place under the caregiver s control. To prevent overdose, a confused person should take medicines only under the supervision of a caregiver.    To help a person with confusion:    Establish a daily routine. Change can be a source of stress for someone with confusion. Make and keep a time schedule for common tasks such as bathing, dressing, taking medicines, meals, going for walks, shopping, naps and bed time.    Speak slowly and clearly with a gentle tone of voice. Use short simple words and sentences. Ask one question at a time. Do not interrupt, criticize or argue. Be calm and supportive. Use friendly facial expressions. Use pointing and touching to help communicate. If there has been loss of long-term memory, do not ask questions about past events. This would only cause frustration for the person.    Use lists, signs, family photos, clocks and calendars as memory aids. Label cabinets and drawers. Try to distract, not confront, the patient. When he/she becomes frustrated or upset, redirect his/her attention to eating or some other activity of interest.    If this proves to be due to a permanent condition, talk to the healthcare provider or a  about getting a Power of  for healthcare and for financial decisions. It is best to do this while the person can still sign legal documents and make his or her own decisions. Otherwise, a court order will be required.  Follow-up care  Follow up with the person's healthcare provider or as advised for further testing or changes in medical care.  When to seek medical advice  Call the healthcare provider for any of the following:    Frequent falling    Refusal to eat or drink    Violent behavior or behavior too difficult to manage at home    New hallucinations or delusions    Increased drowsiness    Complaints of headache or numbness or weakness of the face,  arm or leg    Nausea or vomiting    Slurred speech or trouble speaking, walking, or seeing    Fainting spell, dizziness or seizure    Unexplained fever over 100.4  F (38.0  C) or as directed by the healthcare provider  Date Last Reviewed: 8/23/2015 2000-2017 [employer]. [address] [place] [address]. All rights reserved. This information is not intended as a substitute for professional medical care. Always follow your healthcare professional's instructions.        Pneumonia (Adult)  Pneumonia is an infection deep within the lungs. It is in the small air sacs (alveoli). Pneumonia may be caused by a virus or bacteria. Pneumonia caused by bacteria is usually treated with an antibiotic. Severe cases may need to be treated in the hospital. Milder cases can be treated at home. Symptoms usually start to get better during the first 2 days of treatment.    Home care  Follow these guidelines when caring for yourself at home:    Rest at home for the first 2 to 3 days, or until you feel stronger. Don t let yourself get overly tired when you go back to your activities.    Stay away from cigarette smoke - yours or other people s.    You may use acetaminophen or ibuprofen to control fever or pain, unless another medicine was prescribed. If you have chronic liver or kidney disease, talk with your healthcare provider before using these medicines. Also talk with your provider if you ve had a stomach ulcer or gastrointestinal bleeding. Don t give aspirin to anyone younger than 18 years of age who is ill with a fever. It may cause severe liver damage.    Your appetite may be poor, so a light diet is fine.    Drink 6 to 8 glasses of fluids every day to make sure you are getting enough fluids. Beverages can include water, sport drinks, sodas without caffeine, juices, tea, or soup. Fluids will help loosen secretions in the lung. This will make it easier for you to cough up the phlegm (sputum). If you also  have heart or kidney disease, check with your healthcare provider before you drink extra fluids.    Take antibiotic medicine prescribed until it is all gone, even if you are feeling better after a few days.  Follow-up care  Follow up with your healthcare provider in the next 2 to 3 days, or as advised. This is to be sure the medicine is helping you get better.  If you are 65 or older, you should get a pneumococcal vaccine and a yearly flu (influenza) shot. You should also get these vaccines if you have chronic lung disease like asthma, emphysema, or COPD. Recently, a second type of pneumonia vaccine has become available for everyone over 65 years old. This is in addition to the previous vaccine. Ask your provider about this.  When to seek medical advice  Call your healthcare provider right away if any of these occur:    You don t get better within the first 48 hours of treatment    Shortness of breath gets worse    Rapid breathing (more than 25 breaths per minute)    Coughing up blood    Chest pain gets worse with breathing    Fever of 100.4 F (38 C) or higher that doesn t get better with fever medicine    Weakness, dizziness, or fainting that gets worse    Thirst or dry mouth that gets worse    Sinus pain, headache, or a stiff neck    Chest pain not caused by coughing  Date Last Reviewed: 1/1/2017 2000-2017 [employer]. [address] [place] [address], [place] [address]. All rights reserved. This information is not intended as a substitute for professional medical care. Always follow your healthcare professional's instructions.            24 Hour Appointment Hotline       To make an appointment at any [facility], call [phone] ([phone]). If you don't have a family doctor or clinic, we will help you find one. [facility] clinics are conveniently located to serve the needs of you and your family.             Review of your medicines      START taking        Dose / Directions Last dose taken     doxycycline 100 MG capsule   Commonly known as:  VIBRAMYCIN   Dose:  100 mg   Quantity:  20 capsule   Start taking on:  11/9/2017        Take 1 capsule (100 mg) by mouth 2 times daily for 10 days   Refills:  0          Our records show that you are taking the medicines listed below. If these are incorrect, please call your family doctor or clinic.        Dose / Directions Last dose taken    calcium carbonate 600 MG tablet   Dose:  1 tablet   Generic drug:  calcium        Take 1 tablet by mouth 2 times daily.   Refills:  0        cefdinir 300 MG capsule   Commonly known as:  OMNICEF   Dose:  300 mg   Quantity:  20 capsule        Take 1 capsule (300 mg) by mouth 2 times daily   Refills:  0        diclofenac 1 % Gel topical gel   Commonly known as:  VOLTAREN   Quantity:  100 g        Apply 2-4 grams  to L shoulder  four times daily using enclosed dosing card.   Refills:  1        fish oil-omega-3 fatty acids 1000 MG capsule   Dose:  2 g        Take 2 g by mouth daily.   Refills:  0        FLEXITOL HEEL BALM Oint   Dose:  1 dose.   Quantity:  1 Bottle        Externally apply 1 dose topically daily.   Refills:  2        gabapentin 600 MG tablet   Commonly known as:  NEURONTIN   Dose:  600 mg        Take 600 mg by mouth 3 times daily.   Refills:  0        lisinopril 10 MG tablet   Commonly known as:  PRINIVIL/ZESTRIL   Dose:  10 mg        Take 10 mg by mouth daily.   Refills:  0        methadone 5 MG tablet   Commonly known as:  DOLOPHINE   Dose:  5 mg        Take 5 mg by mouth every 6 hours.   Refills:  0        mirtazapine 15 MG tablet   Commonly known as:  REMERON   Dose:  15 mg        Take 15 mg by mouth At Bedtime.   Refills:  0        morphine sulfate (high concentrate) 20 MG/ML concentrated solution   Commonly known as:  ROXANOL-CONCENTRATED   Dose:  1 mL        Take 1 mL (20 mg) by mouth every 4 hours as needed for moderate to severe pain or other (for breakthrough pain - per [facility] Only)   Refills:  0         multivitamin per tablet   Dose:  1 tablet        Take 1 tablet by mouth daily.   Refills:  0        omeprazole 20 MG CR capsule   Commonly known as:  priLOSEC   Dose:  20 mg        Take 20 mg by mouth daily.   Refills:  0        oxazepam 10 MG capsule   Commonly known as:  SERAX   Dose:  10 mg        Take 10 mg by mouth nightly as needed.   Refills:  0        phenazopyridine 100 MG tablet   Commonly known as:  PYRIDIUM   Dose:  100 mg   Quantity:  12 tablet        Take 1 tablet (100 mg) by mouth 3 times daily as needed   Refills:  0        PRESERVISION AREDS PO        Take  by mouth 2 times daily.   Refills:  0        simvastatin 40 MG tablet   Commonly known as:  ZOCOR        Take 0.5 tablets by mouth at bedtime.   Refills:  0        vitamin D 2000 UNITS tablet   Dose:  1 tablet        Take 1 tablet by mouth daily.   Refills:  0                Prescriptions were sent or printed at these locations (1 Prescription)                   Other Prescriptions                Printed at Department/Unit printer (1 of 1)         doxycycline (VIBRAMYCIN) 100 MG capsule                Procedures and tests performed during your visit     Basic metabolic panel    CBC with platelets differential    CT Head w/o Contrast    Cardiac Continuous Monitoring    EKG 12-lead, tracing only    Peripheral IV catheter    Pulse oximetry nursing    UA with Microscopic    Urine Culture Aerobic Bacterial    XR Chest 2 Views      Orders Needing Specimen Collection     None      Pending Results     Date and Time Order Name Status Description    11/8/2017 1819 Urine Culture Aerobic Bacterial Preliminary     11/8/2017 1819 EKG 12-lead, tracing only Preliminary             Pending Culture Results     Date and Time Order Name Status Description    11/8/2017 1819 Urine Culture Aerobic Bacterial Preliminary             Pending Results Instructions     If you had any lab results that were not finalized at the time of your Discharge, you can call the  ED Lab Result RN at [phone]. You will be contacted by this team for any positive Lab results or changes in treatment. The nurses are available 7 days a week from 10A to 6:30P.  You can leave a message 24 hours per day and they will return your call.        Test Results From Your Hospital Stay        11/8/2017  7:03 PM      Component Results     Component Value Ref Range & Units Status    WBC 7.3 4.0 - 11.0 10e9/L Final    RBC Count 4.60 3.8 - 5.2 10e12/L Final    Hemoglobin 13.1 11.7 - 15.7 g/dL Final    Hematocrit 40.4 35.0 - 47.0 % Final    MCV 88 78 - 100 fl Final    MCH 28.5 26.5 - 33.0 pg Final    MCHC 32.4 31.5 - 36.5 g/dL Final    RDW 15.2 (H) 10.0 - 15.0 % Final    Platelet Count 241 150 - 450 10e9/L Final    Diff Method Automated Method  Final    % Neutrophils 68.2 % Final    % Lymphocytes 16.1 % Final    % Monocytes 13.9 % Final    % Eosinophils 1.4 % Final    % Basophils 0.3 % Final    % Immature Granulocytes 0.1 % Final    Nucleated RBCs 0 0 /100 Final    Absolute Neutrophil 5.0 1.6 - 8.3 10e9/L Final    Absolute Lymphocytes 1.2 0.8 - 5.3 10e9/L Final    Absolute Monocytes 1.0 0.0 - 1.3 10e9/L Final    Absolute Eosinophils 0.1 0.0 - 0.7 10e9/L Final    Absolute Basophils 0.0 0.0 - 0.2 10e9/L Final    Abs Immature Granulocytes 0.0 0 - 0.4 10e9/L Final    Absolute Nucleated RBC 0.0  Final         11/8/2017  7:21 PM      Component Results     Component Value Ref Range & Units Status    Sodium 139 133 - 144 mmol/L Final    Potassium 3.8 3.4 - 5.3 mmol/L Final    Chloride 104 94 - 109 mmol/L Final    Carbon Dioxide 31 20 - 32 mmol/L Final    Anion Gap 4 3 - 14 mmol/L Final    Glucose 84 70 - 99 mg/dL Final    Urea Nitrogen 10 7 - 30 mg/dL Final    Creatinine 0.65 0.52 - 1.04 mg/dL Final    GFR Estimate 88 >60 mL/min/1.7m2 Final    Non  GFR Calc    GFR Estimate If Black >90 >60 mL/min/1.7m2 Final    African American GFR Calc    Calcium 8.7 8.5 - 10.1 mg/dL Final         11/8/2017  7:11 PM       Narrative     CHEST TWO VIEWS   11/8/2017 6:48 PM      HISTORY: Question aspiration.    COMPARISON: None.    FINDINGS: The heart size is normal. The right hemidiaphragm is  elevated. There is patchy infiltrate in the right midlung. Mild  atelectasis or scarring at the left lung base. No pneumothorax or  pleural effusion. Severe arthritis of the left shoulder. Arthritis in  the spine.        Impression     IMPRESSION: Mild pneumonia in the right midlung.    UTE GUAN MD         11/8/2017  7:34 PM      Narrative     CT SCAN OF THE HEAD WITHOUT CONTRAST   11/8/2017 7:17 PM     HISTORY: Altered mental status;     TECHNIQUE: Axial images of the head and coronal reformations without  IV contrast material. Radiation dose for this scan was reduced using  automated exposure control, adjustment of the mA and/or kV according  to patient size, or iterative reconstruction technique.    COMPARISON: None.    FINDINGS: No evidence of acute intracranial hemorrhage. No mass effect  or midline shift. Mild diffuse parenchymal volume loss. The ventricles  are slightly prominent in size, although they do not appear out of  proportion to the volume loss. No definite loss of gray-white matter  differentiation.    The visualized portions of the sinuses and mastoids appear normal. The  bony calvarium and bones of the skull base appear intact. There are  bilateral intraocular lens implants.        Impression     IMPRESSION:     1. No evidence of acute intracranial hemorrhage, mass, or herniation.   2. Mild diffuse parenchymal volume loss.      LELO HOFFMAN MD         11/8/2017  6:32 PM      Component Results     Component Value Ref Range & Units Status    Color Urine Yellow  Final    Appearance Urine Clear  Final    Glucose Urine Negative NEG^Negative mg/dL Final    Bilirubin Urine Negative NEG^Negative Final    Ketones Urine Negative NEG^Negative mg/dL Final    Specific Gravity Urine 1.016 1.003 - 1.035 Final    Blood Urine  Negative NEG^Negative Final    pH Urine 6.0 5.0 - 7.0 pH Final    Protein Albumin Urine 10 (A) NEG^Negative mg/dL Final    Urobilinogen mg/dL 2.0 0.0 - 2.0 mg/dL Final    Nitrite Urine Negative NEG^Negative Final    Leukocyte Esterase Urine Trace (A) NEG^Negative Final    Source Catheterized Urine  Final    WBC Urine 3 (H) 0 - 2 /HPF Final    RBC Urine <1 0 - 2 /HPF Final    Mucous Urine Present (A) NEG^Negative /LPF Final         11/8/2017  9:21 PM      Component Results     Component    Specimen Description    Catheterized Urine    Special Requests    Specimen received in preservative    Culture Micro    PENDING                Clinical Quality Measure: Blood Pressure Screening     Your blood pressure was checked while you were in the emergency department today. The last reading we obtained was  BP: 135/73 . Please read the guidelines below about what these numbers mean and what you should do about them.  If your systolic blood pressure (the top number) is less than 120 and your diastolic blood pressure (the bottom number) is less than 80, then your blood pressure is normal. There is nothing more that you need to do about it.  If your systolic blood pressure (the top number) is 120-139 or your diastolic blood pressure (the bottom number) is 80-89, your blood pressure may be higher than it should be. You should have your blood pressure rechecked within a year by a primary care provider.  If your systolic blood pressure (the top number) is 140 or greater or your diastolic blood pressure (the bottom number) is 90 or greater, you may have high blood pressure. High blood pressure is treatable, but if left untreated over time it can put you at risk for heart attack, stroke, or kidney failure. You should have your blood pressure rechecked by a primary care provider within the next 4 weeks.  If your provider in the emergency department today gave you specific instructions to follow-up with your doctor or provider even  "sooner than that, you should follow that instruction and not wait for up to 4 weeks for your follow-up visit.        Thank you for choosing [facility]       Thank you for choosing [facility] for your care. Our goal is always to provide you with excellent care. Hearing back from our patients is one way we can continue to improve our services. Please take a few minutes to complete the written survey that you may receive in the mail after you visit with us. Thank you!        [employer] Information     [employer] lets you send messages to your doctor, view your test results, renew your prescriptions, schedule appointments and more. To sign up, go to [email or web address] . Click on \"Log in\" on the left side of the screen, which will take you to the Welcome page. Then click on \"Sign up Now\" on the right side of the page.     You will be asked to enter the access code listed below, as well as some personal information. Please follow the directions to create your username and password.     Your access code is: [email or web address]  Expires: 2018  9:36 PM     Your access code will  in 90 days. If you need help or a new code, please call your [facility] clinic or [phone].        Care EveryWhere ID     This is your Care EveryWhere ID. This could be used by other organizations to access your [facility] medical records  [id number]        Equal Access to Services     ISAI ODELL : Hadii julio cesar Hernandez, waaxda luqadaha, qaybta kaalmada gilbert, omari antunez. So [facility] [phone].    ATENCIÓN: Si habla español, tiene a hua disposición servicios gratuitos de asistencia lingüística. Zeus al [phone].    We comply with applicable federal civil rights laws and [place] laws. We do not discriminate on the basis of race, color, national origin, age, disability, sex, sexual orientation, or gender identity.            After Visit Summary       This is your record. Keep this with you and show to " your community pharmacist(s) and doctor(s) at your next visit.

## 2017-11-09 LAB
BACTERIA SPEC CULT: NO GROWTH
INTERPRETATION ECG - MUSE: NORMAL
Lab: NORMAL
SPECIMEN SOURCE: NORMAL

## 2017-11-09 NOTE — ED NOTES
Bed: ED30  Expected date:   Expected time:   Means of arrival:   Comments:  437  81 F decreased LOC/infection?  [id number]

## 2017-11-09 NOTE — DISCHARGE INSTRUCTIONS
Confusion  Confusion is a change in a person s ability to think clearly. There may be trouble recognizing familiar people and places or knowing what day it is. Memory, judgment, and decision-making may also be affected. In severe cases, the person may have limited or no response to being spoken to.  Confusion is usually a sign of an underlying problem. It may occur suddenly. Or it may develop gradually over time. Causes of confusion include brain injury, medicines, alcohol, withdrawal from certain medicines or illegal drugs, and infection. Heart attack and stroke may cause it. Confusion can also be a sign of dementia or a mental illness.  Treatment will depend on the cause of the problem. If the issue is a medicine, stopping the medicine may help. The healthcare provider will perform an evaluation and certain tests may be done. Follow up with the healthcare provider for the results.  Home care    Be sure someone is with the confused person at all times. He or she should not be left alone or unsupervised.    Tell the healthcare provider about all medicines that the person takes. These include prescription, over-the-counter, herbs, and supplements.    Dehydration can increase confusion. Ask the healthcare provider how much fluid the person should be drinking. Offer liquids and ensure that they are taken.    Keep all medicines in a secure place under the caregiver s control. To prevent overdose, a confused person should take medicines only under the supervision of a caregiver.    To help a person with confusion:    Establish a daily routine. Change can be a source of stress for someone with confusion. Make and keep a time schedule for common tasks such as bathing, dressing, taking medicines, meals, going for walks, shopping, naps and bed time.    Speak slowly and clearly with a gentle tone of voice. Use short simple words and sentences. Ask one question at a time. Do not interrupt, criticize or argue. Be calm and  supportive. Use friendly facial expressions. Use pointing and touching to help communicate. If there has been loss of long-term memory, do not ask questions about past events. This would only cause frustration for the person.    Use lists, signs, family photos, clocks and calendars as memory aids. Label cabinets and drawers. Try to distract, not confront, the patient. When he/she becomes frustrated or upset, redirect his/her attention to eating or some other activity of interest.    If this proves to be due to a permanent condition, talk to the healthcare provider or a  about getting a Power of  for healthcare and for financial decisions. It is best to do this while the person can still sign legal documents and make his or her own decisions. Otherwise, a court order will be required.  Follow-up care  Follow up with the person's healthcare provider or as advised for further testing or changes in medical care.  When to seek medical advice  Call the healthcare provider for any of the following:    Frequent falling    Refusal to eat or drink    Violent behavior or behavior too difficult to manage at home    New hallucinations or delusions    Increased drowsiness    Complaints of headache or numbness or weakness of the face, arm or leg    Nausea or vomiting    Slurred speech or trouble speaking, walking, or seeing    Fainting spell, dizziness or seizure    Unexplained fever over 100.4  F (38.0  C) or as directed by the healthcare provider  Date Last Reviewed: 8/23/2015 2000-2017 [employer]. [address] [place] [address]. All rights reserved. This information is not intended as a substitute for professional medical care. Always follow your healthcare professional's instructions.        Pneumonia (Adult)  Pneumonia is an infection deep within the lungs. It is in the small air sacs (alveoli). Pneumonia may be caused by a virus or bacteria. Pneumonia caused by bacteria is usually  treated with an antibiotic. Severe cases may need to be treated in the hospital. Milder cases can be treated at home. Symptoms usually start to get better during the first 2 days of treatment.    Home care  Follow these guidelines when caring for yourself at home:    Rest at home for the first 2 to 3 days, or until you feel stronger. Don t let yourself get overly tired when you go back to your activities.    Stay away from cigarette smoke - yours or other people s.    You may use acetaminophen or ibuprofen to control fever or pain, unless another medicine was prescribed. If you have chronic liver or kidney disease, talk with your healthcare provider before using these medicines. Also talk with your provider if you ve had a stomach ulcer or gastrointestinal bleeding. Don t give aspirin to anyone younger than 18 years of age who is ill with a fever. It may cause severe liver damage.    Your appetite may be poor, so a light diet is fine.    Drink 6 to 8 glasses of fluids every day to make sure you are getting enough fluids. Beverages can include water, sport drinks, sodas without caffeine, juices, tea, or soup. Fluids will help loosen secretions in the lung. This will make it easier for you to cough up the phlegm (sputum). If you also have heart or kidney disease, check with your healthcare provider before you drink extra fluids.    Take antibiotic medicine prescribed until it is all gone, even if you are feeling better after a few days.  Follow-up care  Follow up with your healthcare provider in the next 2 to 3 days, or as advised. This is to be sure the medicine is helping you get better.  If you are 65 or older, you should get a pneumococcal vaccine and a yearly flu (influenza) shot. You should also get these vaccines if you have chronic lung disease like asthma, emphysema, or COPD. Recently, a second type of pneumonia vaccine has become available for everyone over 65 years old. This is in addition to the previous  vaccine. Ask your provider about this.  When to seek medical advice  Call your healthcare provider right away if any of these occur:    You don t get better within the first 48 hours of treatment    Shortness of breath gets worse    Rapid breathing (more than 25 breaths per minute)    Coughing up blood    Chest pain gets worse with breathing    Fever of 100.4 F (38 C) or higher that doesn t get better with fever medicine    Weakness, dizziness, or fainting that gets worse    Thirst or dry mouth that gets worse    Sinus pain, headache, or a stiff neck    Chest pain not caused by coughing  Date Last Reviewed: 1/1/2017 2000-2017 [employer]. [address] [place] [address]. All rights reserved. This information is not intended as a substitute for professional medical care. Always follow your healthcare professional's instructions.

## 2017-11-09 NOTE — ED PROVIDER NOTES
History     Chief Complaint:  Altered Mental Status    History is limited as the patient is unable to provide a history secondary to her altered mental status.     ELENA Rubio is a 81 year old female who presents to the emergency department today via EMS for evaluation of an altered mental status. The EMS report notes that the patient was found at her living facility at 1500 this afternoon in an altered mental state. Per EMS report and the patient's medical records, she has a history of opioid dependency and a history of suspect behavior regarding her pain medications. Notably, the patient takes Methadone, Oxazepam, Morphine, and Gabapentin.     Allergies:  Compazine     Medications:    Cefdinir  Pyridium  Voltaren  Zocor  Morphine  Omeprazole  Lisinopril  Methadone  Neurontin  Remeron  Oxazepam  Cholecalciferol  Calcium    Past Medical History:    Nonexudative senile macular degeneration of retina      Polyneuropathy in other diseases classified elsewhere      Opioid type dependence, unspecified      Acute MI     HTN (hypertension)      Osteomyelitis of toe of left foot       ASCVD (arteriosclerotic cardiovascular disease)      GERD (gastroesophageal reflux disease)      Urinary incontinence      Mixed hyperlipidemia      Anxiety state      Idiopathic peripheral neuropathy      Chronic pain syndrome      Insomnia      Malaise and fatigue      Osteoarthritis      Macular degeneration    Past Surgical History:    Hysterectomy  Bladder suspension  Heart catheterization  Right TKA  Left TKA    Family History:    Esophageal Cancer  Brother     Heart Disease   Father    Social History:  Smoking Status: Never Smoker  Smokeless Tobacco: Never Used  Alcohol Use: Negative   Marital Status:       Review of Systems   Unable to perform ROS: Mental status change     Physical Exam     Patient Vitals for the past 24 hrs:   BP Temp Temp src Pulse Heart Rate Resp SpO2 Height Weight   11/08/17 1900 - - - - 85  "(!) 6 98 % - -   11/08/17 1855 135/73 - - - 92 9 99 % - -   11/08/17 1830 - - - - 98 14 97 % - -   11/08/17 1821 142/81 98.7  F (37.1  C) Rectal 96 - 12 90 % 1.702 m (5' 7\") 59 kg (130 lb)     Physical Exam  SKIN:  Warm, dry.  Atraumatic.  No erythema.  No jaundice.  No rash.  HEMATOLOGIC/IMMUNOLOGIC/LYMPHATIC:  No pallor or edema.  HENT:  Moist oral mucosa.  No oral or dental trauma.  Painless passive ROM head and neck.  EYES:  Eyes closed during exam.  Will open eyes on command.  Conjunctivae normal.  PERRL.    CARDIOVASCULAR:  Regular rate and rhythm.  No murmur.  RESPIRATORY:  No respiratory distress, breath sounds equal and normal.  GASTROINTESTINAL:  Soft, nontender abdomen.  No mass or distension.  Normal bowel sounds.  MUSCULOSKELETAL:  Full painless upper and lower extremity passive range of motion.  NEUROLOGIC:  On arrival:  Seems somnolent but periodically appropriately and quickly responds to questioning.  No gross motor or sensory deficit.  At the end of ED visit:  Alert, oriented, no aphasia or dysarthria.  No motor or sensory deficit or ataxia.  PSYCHIATRIC:  No psychotic features.    Emergency Department Course     ECG:  ECG taken at 1828, ECG read at 1843  Normal sinus rhythm  Normal ECG  Rate 98 bpm. NV interval 126 ms. QRS duration 84 ms. QT/QTc 358/457 ms. P-R-T axes 53 39 10.    Imaging:  Radiology findings were communicated with the patient who voiced understanding of the findings.    CT Head w/o Contrast  1. No evidence of acute intracranial hemorrhage, mass, or herniation.   2. Mild diffuse parenchymal volume loss.  Reading per radiology    XR Chest 2 Views  Mild pneumonia in the right midlung.  UTE GUAN MD  Reading per radiology    Laboratory:  Laboratory findings were communicated with the patient who voiced understanding of the findings.    CBC: WBC 7.3, HGB 13.1,   BMP: Creatinine 0.65  UA: Protein Albumin: 10 (A), Leukocyte Esterase: Trace (A), WBC/HPF: 3 (H), Mucous: " "Present (A)  Urine Culture Aerobic Bacterial: Pending    Interventions:  2131 Doxycycline 100 mg PO    Emergency Department Course:    1809 Nursing notes and vitals reviewed.    1810 I performed an exam of the patient as documented above.     1822 The patient provided a urine sample here in the emergency department. This was sent for laboratory testing, findings above.    1829 IV was inserted and blood was drawn for laboratory testing, results above.      1840 Report from the nursing staff is that the patient is now lucid and talking with her eyes open.     1848 The patient was sent for a XR Chest 2 Views while in the emergency department, results above.      1901 The patient was sent for a CT Head w/o Contrast while in the emergency department, results above.      2057 I personally reviewed the laboratory, EKG, and imaging results with the patient and answered all related questions prior to discharge.    Impression & Plan      Medical Decision Making:  Ena Rubio is a 81 year old female who presents to the emergency department today with concerns of altered mentation and the prospect of pneumonia. On arrival, the patient had her eyes closed, but at times when the nurse was trying to move the patient's left arm to start an IV she said \"Don't, not that arm.\" So she was communicating, just with her eyes closed, which is not consistent with a severe overdose. Eventually, without intervention, such as Narcan, the patient was conversant and back to her baseline and her daughter later arrived and agreed that the patient is at her cognitive baseline. Screening tests were unrevealing barring a scant opacity of the right lung on x-ray. She may be suffering pneumonia, albeit somewhat atypical without leukocytosis or fever. She will be discharged back to her residence where she has been only for one week. There may be a psychological component to her presentation. Doxycyline was initiated her and I prescribed a ten day " course and advised follow up regarding the x-ray findings and if she has any other concerns in the near future.     Diagnosis:    ICD-10-CM    1. Altered mental status, unspecified altered mental status type R41.82 Urine Culture Aerobic Bacterial   2. Pneumonia of right middle lobe due to infectious organism (H) J18.1      Disposition:   The patient is discharged to home.    Discharge Medications:  New Prescriptions    DOXYCYCLINE (VIBRAMYCIN) 100 MG CAPSULE    Take 1 capsule (100 mg) by mouth 2 times daily for 10 days     Scribe Disclosure:  Neymar LANDEROS, am serving as a scribe at 6:15 PM on 11/8/2017 to document services personally performed by Augusto Jones MD, based on my observations and the provider's statements to me.     EMERGENCY DEPARTMENT       Augusto Jones MD  11/09/17 [id number]

## 2017-11-18 ENCOUNTER — TELEPHONE (OUTPATIENT)
Dept: INTERNAL MEDICINE | Facility: CLINIC | Age: 82
End: 2017-11-18

## 2017-11-18 NOTE — TELEPHONE ENCOUNTER
Patient has an order for methadone 15mg BID for chronic pain syndrome  Patient admitted last night 11/17/17 at 6pm  Hospital only sent a prescription for only 5 tabs which is only enough for one dose.   Action: called [facility] and ordered an emergency supply of 15 tabs

## 2017-11-20 ENCOUNTER — NURSING HOME VISIT (OUTPATIENT)
Dept: GERIATRICS | Facility: CLINIC | Age: 82
End: 2017-11-20
Payer: MEDICARE

## 2017-11-20 VITALS
RESPIRATION RATE: 18 BRPM | TEMPERATURE: 98.6 F | HEART RATE: 80 BPM | WEIGHT: 123.6 LBS | BODY MASS INDEX: 19.36 KG/M2 | SYSTOLIC BLOOD PRESSURE: 154 MMHG | OXYGEN SATURATION: 93 % | DIASTOLIC BLOOD PRESSURE: 82 MMHG

## 2017-11-20 DIAGNOSIS — L89.629 DECUBITUS ULCER OF LEFT HEEL, UNSPECIFIED ULCER STAGE: ICD-10-CM

## 2017-11-20 DIAGNOSIS — H35.30 MACULAR DEGENERATION (SENILE) OF RETINA: ICD-10-CM

## 2017-11-20 DIAGNOSIS — G89.4 CHRONIC PAIN SYNDROME: ICD-10-CM

## 2017-11-20 DIAGNOSIS — R32 BOWEL AND BLADDER INCONTINENCE: ICD-10-CM

## 2017-11-20 DIAGNOSIS — I10 BENIGN ESSENTIAL HYPERTENSION: ICD-10-CM

## 2017-11-20 DIAGNOSIS — I25.10 CORONARY ARTERY DISEASE INVOLVING NATIVE CORONARY ARTERY OF NATIVE HEART WITHOUT ANGINA PECTORIS: ICD-10-CM

## 2017-11-20 DIAGNOSIS — R41.82 ALTERED MENTAL STATUS, UNSPECIFIED ALTERED MENTAL STATUS TYPE: Primary | ICD-10-CM

## 2017-11-20 DIAGNOSIS — E78.2 MIXED HYPERLIPIDEMIA: ICD-10-CM

## 2017-11-20 DIAGNOSIS — R15.9 BOWEL AND BLADDER INCONTINENCE: ICD-10-CM

## 2017-11-20 DIAGNOSIS — F41.9 ANXIETY: ICD-10-CM

## 2017-11-20 DIAGNOSIS — R53.81 PHYSICAL DECONDITIONING: ICD-10-CM

## 2017-11-20 DIAGNOSIS — K21.9 GASTROESOPHAGEAL REFLUX DISEASE WITHOUT ESOPHAGITIS: ICD-10-CM

## 2017-11-20 DIAGNOSIS — G60.9 HEREDITARY AND IDIOPATHIC PERIPHERAL NEUROPATHY: ICD-10-CM

## 2017-11-20 DIAGNOSIS — R41.89 COGNITIVE IMPAIRMENT: ICD-10-CM

## 2017-11-20 PROCEDURE — 99309 SBSQ NF CARE MODERATE MDM 30: CPT | Performed by: NURSE PRACTITIONER

## 2017-11-20 RX ORDER — POLYETHYLENE GLYCOL 3350 17 G/17G
1 POWDER, FOR SOLUTION ORAL DAILY
COMMUNITY

## 2017-11-20 NOTE — PROGRESS NOTES
"[place] [facility]  PRIMARY CARE PROVIDER AND CLINIC:  Jermaine Guzman [facility] [address] / [place]*  Chief Complaint   Patient presents with     Hospital F/U       HPI:    Ena Rubio is a 81 year old  (1935),admitted to the [facility]  from [facility] .  Hospital stay 11/13/17 through 11/17/17.  Admitted to this facility for  rehab, medical management and nursing care.  HPI information obtained from: facility chart records.      Ena Rubio is a 81 y.o. female with a medical history of CAD, chronic bowel and bladder incontinence, hyperlipidemia, idiopathic peripheral neuropathy, chronic pain, macular degeneration, and hx of MSSA bilateral feet ulcers. She was placed in a SNF on 11/1/17 as her spouse, primary care giver, was having surgery at the [facility]. Family noticed that she had decreased oral intake after admission to the facility and EMS was called on 11/8/17 for altered mental status. She was brought to [facility] for evaluation. Head CT negative, chest x-ray resulted with a scant opacity of right lung so she was discharged back to the care facility with a 10 day course of doxycycline, with mental status back to baseline.     She became increasingly weak at the facility and began having delusions so she was sent to [facility] on 11/13/17. Upon arrival she was hypertensive with systolic pressures up to 220's and heart rate of 128. She was given narcan and mental status improved for a short time only. Chest x-ray, CT head, abdomen/pelvis, and cervical spine all negative for infection. Of note, the NP that was caring for her at the prior facility reported to the ED physician that \"the patient had been found with a drug bottle of mixed medications. They were concerned that patient was hoarding her Benzos and narcotics and potentially overdosing\" so the NP was working to taper off methadone and oxazepam. She was seen by the pain " service during admission and During admission she was treated with zosyn and vanco for possible pneumonia, both abx were stopped after blood cultures showed no growth. Mental status cleared after methadone and oxazepam doses were decreased. She was seen by the pain service who recommended to continue on the decreased doses of oxazepam 10 mg BID and methadone 15 mg BID. She was also evaluated by psychiatry who noted that Ms. Rubio may have worsening cognitive decline and recommended further cognitive evaluation as an outpatient. She was admitted to this facility for PT/OT and medical management.     Ms. Rubio was seen today while in her wheelchair while eating breakfast. She has been eating well without N/V. She has been incontinent of bladder, which is chronic. She believes that she has been having regular bowel movements. Left heel ulcer is present which is chronic and she denies pain. Neuropathy is controlled with gabapentin and methadone. She states that she is followed by Dr. Castaneda at the pain clinic and is unsure of the last time she had an appt with her. She is currently wheelchair bound and requires assist of one to transfer and with cares. Denies chest pain, palpitations, or shortness of breath.    BP: 115-171/67-94 mmHg  P: 76-87  O2: 92-97%    CODE STATUS/ADVANCE DIRECTIVES DISCUSSION:   DNR / DNI  Patient's living condition: lives with spouse    ALLERGIES:Compazine  PAST MEDICAL HISTORY:  has no past medical history on file.  PAST SURGICAL HISTORY:  has no past surgical history on file.  FAMILY HISTORY: family history is not on file.  SOCIAL HISTORY:  reports that she has never smoked. She has never used smokeless tobacco. She reports that she does not drink alcohol or use illicit drugs.    Post Discharge Medication Reconciliation Status: discharge medications reconciled, continue medications without change.  Current Outpatient Prescriptions   Medication Sig Dispense Refill     ASPIRIN PO Take 81 mg by  mouth daily       Acetaminophen (TYLENOL PO) Take 650 mg by mouth every 4 hours as needed for mild pain or fever       VITAMIN D, CHOLECALCIFEROL, PO Take 1,000 Units by mouth daily       polyethylene glycol (MIRALAX/GLYCOLAX) Packet Take 1 packet by mouth daily       DOCUSATE SODIUM PO Take 100 mg by mouth daily       simvastatin (ZOCOR) 40 MG tablet 20 mg At Bedtime        omeprazole (PRILOSEC) 20 MG capsule Take 20 mg by mouth 2 times daily        lisinopril (PRINIVIL,ZESTRIL) 10 MG tablet Take 10 mg by mouth daily.       methadone (DOLOPHINE) 5 MG tablet Take 15 mg by mouth 2 times daily        gabapentin (NEURONTIN) 600 MG tablet Take 300 mg by mouth 3 times daily        mirtazapine (REMERON) 15 MG tablet Take 15 mg by mouth At Bedtime.       Multiple Vitamins-Minerals (PRESERVISION AREDS PO) Take  by mouth 2 times daily.       fish oil-omega-3 fatty acids (OMEGA 3) 1000 MG capsule Take 1 capsule by mouth daily        oxazepam (SERAX) 10 MG capsule Take 10 mg by mouth 2 times daily          ROS:  10 point ROS of systems including Constitutional, Eyes, Respiratory, Cardiovascular, Gastroenterology, Genitourinary, Integumentary, Muscularskeletal, Psychiatric were all negative except for pertinent positives noted in my HPI.    Exam:  /82  Pulse 80  Temp 98.6  F (37  C)  Resp 18  Wt 123 lb 9.6 oz (56.1 kg)  SpO2 93%  BMI 19.36 kg/m2  GENERAL APPEARANCE:  Alert, in no distress, pleasant, cooperative.  EYES:  EOM, lids, pupils and irises normal, sclera clear and conjunctiva normal, no discharge or mattering on lids or lashes noted  ENT:  Mouth normal, moist mucous membranes, nose without drainage or crusting, external ears without lesions, hearing acuity intact  RESP:  respiratory effort and palpation of chest normal, no chest wall tenderness, no respiratory distress, Lung sounds clear, patient is on room air  CV:  Palpation and auscultation of heart done, rate and rhythm regular, no murmur, no rub or  gallop. Edema none   ABDOMEN:  normal bowel sounds, soft, non-tender.  M/S:   Gait and station wheelchair bound and unsafe without assistance, no tenderness or swelling of the joints; able to move all extremities  SKIN:  Inspection and palpation of skin and subcutaneous tissue: skin warm, dry. Wound to   NEURO: cranial nerves 2-12 grossly intact, no facial asymmetry, no speech deficits and able to follow directions, moves all extremities symmetrically  PSYCH:  insight and judgement impaired, memory impaired, affect and mood normal    Lab/Diagnostic data:  CBC RESULTS:   Recent Labs   Lab Test  11/08/17   1832  10/02/14   1504   WBC  7.3  5.7   RBC  4.60  4.18   HGB  13.1  12.1   HCT  40.4  37.6   MCV  88  90   MCH  28.5  28.9   MCHC  32.4  32.2   RDW  15.2*  13.3   PLT  241  272       Last Basic Metabolic Panel:  Recent Labs   Lab Test  11/08/17   1832   NA  139   POTASSIUM  3.8   CHLORIDE  104   XIN  8.7   CO2  31   BUN  10   CR  0.65   GLC  84       ASSESSMENT/PLAN:  (R41.82) Altered mental status, unspecified altered mental status type  (primary encounter diagnosis)  Comment: secondary to possible self medicating with benzos vs withdrawal from significant decrease in oxazepam at previous nursing home. Thought most likely due to overdose as she responded to narcan. Mental status now back at baseline.   Plan: continue to monitor. Nursing staff to administer all medications.     (I25.10) Coronary artery disease involving native coronary artery of native heart without angina pectoris  (I10) Benign essential hypertension  (E78.2) Mixed hyperlipidemia  Comment: chronic. Elevated blood pressures at the facility. No complaints of chest pain.   Plan: increase lisinopril to 20 mg daily. Continue to monitor blood pressures and titrate antihypertensives as needed to obtain a goal blood pressure of <150/90. BMP on 11/22/17. Continue with asa 81 mg.    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 On chronic methadone. Also takes gabapentin 300 mg TID and pain is controlled on exam today.   Plan: continue with methadone 15 mg BID and gabapentin 300 mg TID. Assist with toileting.     (G89.4) Chronic pain syndrome  (F41.9) Anxiety  Comment: Oxazepam was decreased from 10 mg QID to 5 mg BID at previous facility. Now taking 10 mg BID. Recommendations by psychiatry to taper off slowly over the next several weeks and perhaps started an SSRI for bridging. Followed by Tonya Chu NP, at [facility]-last appt on 9/12/17.  Plan: speak with family about outpatient psychiatry, future titration and initiation of SSRI. Continue with oxazepam 10 mg BID and methadone 15 mg BID and mirtazapine 15 mg at . A follow up appt has been scheduled with Tonya Chu in December. ACP to evaluate at [facility].     (L89.169) Decubitus ulcer of left heel, unspecified ulcer stage  Comment: chronic. Has prevalon boots which were not in place today.   Plan: in house wound MD to assess on rounds. Prevalon boots while in bed and in wheelchair.     (K21.9) Gastroesophageal reflux disease without esophagitis  Comment: chronic, controlled.   Plan: continue with omeprazole 20 mg BID.     (R41.89) Cognitive impairment  Comment: noted during psychiatry consultation while inpatient. SLUMS 16/30 at the facility. currently receiving 24/7 nursing care   Plan: Continue with 24/7 nursing care.     Constipation due to medications  Comment: having regular bowel movements  Plan: continue with miralax daily and docusate 100 mg daily.     (H35.30) Macular degeneration (senile) of retina  Comment: blindness in right eye.     (R53.81) Physical deconditioning  Comment: chronic due to underlying medical conditions and hospitalization.   Plan: PT/OT/SLP      Electronically signed by:  CLAIRE Wong CNP

## 2017-11-22 ENCOUNTER — TRANSFERRED RECORDS (OUTPATIENT)
Dept: HEALTH INFORMATION MANAGEMENT | Facility: CLINIC | Age: 82
End: 2017-11-22

## 2017-11-22 PROBLEM — E78.2 MIXED HYPERLIPIDEMIA: Status: ACTIVE | Noted: 2017-11-22

## 2017-11-22 PROBLEM — F41.9 ANXIETY: Status: ACTIVE | Noted: 2017-11-22

## 2017-11-22 PROBLEM — R32 BOWEL AND BLADDER INCONTINENCE: Status: ACTIVE | Noted: 2017-11-22

## 2017-11-22 PROBLEM — H35.30 MACULAR DEGENERATION (SENILE) OF RETINA: Status: ACTIVE | Noted: 2017-11-22

## 2017-11-22 PROBLEM — R41.89 COGNITIVE IMPAIRMENT: Status: ACTIVE | Noted: 2017-11-22

## 2017-11-22 PROBLEM — R53.81 PHYSICAL DECONDITIONING: Status: ACTIVE | Noted: 2017-11-22

## 2017-11-22 PROBLEM — K21.9 GASTROESOPHAGEAL REFLUX DISEASE WITHOUT ESOPHAGITIS: Status: ACTIVE | Noted: 2017-11-22

## 2017-11-22 PROBLEM — R15.9 BOWEL AND BLADDER INCONTINENCE: Status: ACTIVE | Noted: 2017-11-22

## 2017-11-22 PROBLEM — I10 BENIGN ESSENTIAL HYPERTENSION: Status: ACTIVE | Noted: 2017-11-22

## 2017-11-22 PROBLEM — R41.82 ALTERED MENTAL STATUS, UNSPECIFIED ALTERED MENTAL STATUS TYPE: Status: ACTIVE | Noted: 2017-11-22

## 2017-11-22 PROBLEM — I25.10 CORONARY ARTERY DISEASE INVOLVING NATIVE CORONARY ARTERY OF NATIVE HEART WITHOUT ANGINA PECTORIS: Status: ACTIVE | Noted: 2017-11-22

## 2017-11-22 LAB
ANION GAP SERPL CALCULATED.3IONS-SCNC: 8 MMOL/L (ref 0–15)
BUN SERPL-MCNC: 12 MG/DL (ref 7–24)
CALCIUM SERPL-MCNC: 8.3 MG/DL (ref 8.5–10.1)
CHLORIDE SERPLBLD-SCNC: 108 MMOL/L (ref 98–112)
CO2 SERPL-SCNC: 26 MMOL/L (ref 21–32)
CREAT SERPL-MCNC: 0.56 MG/DL (ref 0.55–1.02)
GFR SERPL CREATININE-BSD FRML MDRD: >60 ML/MIN/1.73M2
GLUCOSE SERPL-MCNC: 115 MG/DL (ref 74–106)
POTASSIUM SERPL-SCNC: 3.7 MMOL/L (ref 3.5–5.1)
SODIUM SERPL-SCNC: 142 MMOL/L (ref 136–145)

## 2017-11-24 VITALS
BODY MASS INDEX: 19.45 KG/M2 | HEART RATE: 82 BPM | SYSTOLIC BLOOD PRESSURE: 144 MMHG | WEIGHT: 124.2 LBS | TEMPERATURE: 98.3 F | RESPIRATION RATE: 18 BRPM | DIASTOLIC BLOOD PRESSURE: 80 MMHG | OXYGEN SATURATION: 93 %

## 2017-11-27 ENCOUNTER — NURSING HOME VISIT (OUTPATIENT)
Dept: GERIATRICS | Facility: CLINIC | Age: 82
End: 2017-11-27
Payer: MEDICARE

## 2017-11-27 DIAGNOSIS — K59.03 DRUG INDUCED CONSTIPATION: ICD-10-CM

## 2017-11-27 DIAGNOSIS — R53.81 PHYSICAL DECONDITIONING: ICD-10-CM

## 2017-11-27 DIAGNOSIS — I25.10 CORONARY ARTERY DISEASE INVOLVING NATIVE CORONARY ARTERY OF NATIVE HEART WITHOUT ANGINA PECTORIS: ICD-10-CM

## 2017-11-27 DIAGNOSIS — G92.9 TOXIC ENCEPHALOPATHY: Primary | ICD-10-CM

## 2017-11-27 DIAGNOSIS — G60.9 IDIOPATHIC PERIPHERAL NEUROPATHY: ICD-10-CM

## 2017-11-27 DIAGNOSIS — F41.8 DEPRESSION WITH ANXIETY: ICD-10-CM

## 2017-11-27 DIAGNOSIS — I10 BENIGN ESSENTIAL HYPERTENSION: ICD-10-CM

## 2017-11-27 DIAGNOSIS — K21.9 GASTROESOPHAGEAL REFLUX DISEASE WITHOUT ESOPHAGITIS: ICD-10-CM

## 2017-11-27 DIAGNOSIS — E78.00 PURE HYPERCHOLESTEROLEMIA: ICD-10-CM

## 2017-11-27 DIAGNOSIS — R41.89 COGNITIVE IMPAIRMENT: ICD-10-CM

## 2017-11-27 DIAGNOSIS — G89.4 CHRONIC PAIN SYNDROME: ICD-10-CM

## 2017-11-27 PROCEDURE — 99306 1ST NF CARE HIGH MDM 50: CPT | Performed by: INTERNAL MEDICINE

## 2017-11-27 NOTE — PROGRESS NOTES
[place] [facility]  INITIAL VISIT NOTE  November 27, 2017    PRIMARY CARE PROVIDER AND CLINIC:  Jermaine Guzman [facility] [address] / [place]*    Chief Complaint   Patient presents with     Hospital F/U       HPI:    Ena Rubio is a 81 year old  (1935) female who was seen at [facility] on November 27, 2017 for an initial visit. Medical history is notable for CAD, chronic pain with methadone dependence, idiopathic peripheral neuropathy and HLD. She was hospitalized at [facility] from 11/13/17 to 11/17/17 where she presented with weakness and delusions. She was staying in a SNF as respite while her spouse was recovering from surgery. She was seen in the ER on 11/8 after an episode of unresponsiveness and was diagnosed with PNA. Back at the SNF family was concerned about progressive weakness and delusions. Also concern she was self medicating as there was a bottle of mixed medications from home. She was felt to have toxic metabolic encephalopathy secondary to opioids as her mental status improved with narcan. She was treated for a possible PNA. Psychiatry and the pain service were consulted and methadone and oxazepam doses were decreased and outpatient neuropsych testing was recommended. She was admitted to this facility for medical management and rehab.     Today, Ms. Rubio is seen after breakfast. History is limited due to cognitive impairment. No chest pain or dyspnea. Working with therapies.     CODE STATUS:   DNR / DNI    ALLERGIES:     Allergies   Allergen Reactions     Compazine Anaphylaxis     Stopped breathing/seizure       PAST MEDICAL HISTORY:   No past medical history on file.    PAST SURGICAL HISTORY:   No past surgical history on file.    FAMILY HISTORY:   Unable to review due to cognitive impairment    SOCIAL HISTORY:   Lives with spouse     MEDICATIONS:  Current Outpatient Prescriptions   Medication Sig Dispense Refill     ASPIRIN PO Take 81 mg by  mouth daily       Acetaminophen (TYLENOL PO) Take 650 mg by mouth every 4 hours as needed for mild pain or fever       VITAMIN D, CHOLECALCIFEROL, PO Take 1,000 Units by mouth daily       polyethylene glycol (MIRALAX/GLYCOLAX) Packet Take 1 packet by mouth daily       DOCUSATE SODIUM PO Take 100 mg by mouth daily       simvastatin (ZOCOR) 40 MG tablet 20 mg At Bedtime        omeprazole (PRILOSEC) 20 MG capsule Take 20 mg by mouth 2 times daily        lisinopril (PRINIVIL,ZESTRIL) 10 MG tablet Take 20 mg by mouth daily        methadone (DOLOPHINE) 5 MG tablet Take 15 mg by mouth 2 times daily        gabapentin (NEURONTIN) 600 MG tablet Take 300 mg by mouth 3 times daily        mirtazapine (REMERON) 15 MG tablet Take 15 mg by mouth At Bedtime.       Multiple Vitamins-Minerals (PRESERVISION AREDS PO) Take  by mouth 2 times daily.       fish oil-omega-3 fatty acids (OMEGA 3) 1000 MG capsule Take 1 capsule by mouth daily        oxazepam (SERAX) 10 MG capsule Take 10 mg by mouth 2 times daily          Post Discharge Medication Reconciliation Status: medication reconcilation previously completed during another office visit.    ROS:  Unable to obtain due to cognitive impairment or aphasia    PHYSICAL EXAM:  /80  Pulse 82  Temp 98.3  F (36.8  C)  Resp 18  Wt 124 lb 3.2 oz (56.3 kg)  SpO2 93%  BMI 19.45 kg/m2  Gen: sitting in chair, alert, cooperative and in no acute distress  HEENT: normocephalic; oropharynx clear  Card: RRR, S1, S2, no murmurs  Resp: lungs clear to auscultation bilaterally  GI: abdomen soft, not-tender  MSK: normal muscle tone, no LE edema  Neuro: CX II-XII grossly in tact; ROM in all four extremities grossly in tact  Psych: memory, judgement and insight impaired    LABORATORY/IMAGING DATA:  Reviewed as per Epic    ASSESSMENT/PLAN:    Toxic Encephalopathy, Improving  Cognitive Impairment  In setting of likely overdose of methadone and benzos (she had a bottle of mixed home meds in her possession  at [facility]). Concern by psychiatry of underlying cognitive decline. SLUMS 16/30 at [facility].   -- OT following  -- follow clinically     Chronic Pain Syndrome  Follows with a pain clinic. Doses of methadone and oxazepam reduced during hosptialization.   -- continues on methadone 15 mg BID and oxazepam 10 mg BID   -- follow up with pain clinic as scheduled    CAD / HTN / HLD  SBPs 140s overall, improving with increase in lisinopril on 11/20.   -- continues on ASA 81 mg daily, lisinopril 20 mg daily and simvastatin 20 mg qhs  -- follow BPs and adjust medications as needed    Depression / Anxiety  -- continues on mirtazapine 15 mg qhs  -- supportive cares     Idiopathic Peripheral Neuropathy  -- continues on gabapentin 300 mg TID    GERD  -- continues on omeprazole 20 mg BID    Drug Induced Constipation  -- continues on docusate 100 mg daily and Miralax 17g daily   -- adjust bowel regimen as needed    Physical Deconditioning  In setting of hospitalization and underlying medical conditions  -- ongoing PT/OT      Electronically signed by:  Madelyn Landrum MD

## 2017-11-29 ENCOUNTER — NURSING HOME VISIT (OUTPATIENT)
Dept: GERIATRICS | Facility: CLINIC | Age: 82
End: 2017-11-29
Payer: MEDICARE

## 2017-11-29 VITALS
HEIGHT: 66 IN | DIASTOLIC BLOOD PRESSURE: 93 MMHG | WEIGHT: 133.8 LBS | OXYGEN SATURATION: 94 % | RESPIRATION RATE: 18 BRPM | SYSTOLIC BLOOD PRESSURE: 161 MMHG | HEART RATE: 110 BPM | BODY MASS INDEX: 21.5 KG/M2 | TEMPERATURE: 98.4 F

## 2017-11-29 DIAGNOSIS — F41.8 DEPRESSION WITH ANXIETY: ICD-10-CM

## 2017-11-29 DIAGNOSIS — G92.9 TOXIC ENCEPHALOPATHY: Primary | ICD-10-CM

## 2017-11-29 DIAGNOSIS — G89.4 CHRONIC PAIN SYNDROME: ICD-10-CM

## 2017-11-29 DIAGNOSIS — I10 BENIGN ESSENTIAL HYPERTENSION: ICD-10-CM

## 2017-11-29 DIAGNOSIS — R41.89 COGNITIVE IMPAIRMENT: ICD-10-CM

## 2017-11-29 DIAGNOSIS — R53.81 PHYSICAL DECONDITIONING: ICD-10-CM

## 2017-11-29 DIAGNOSIS — R00.0 TACHYCARDIA: ICD-10-CM

## 2017-11-29 PROCEDURE — 99309 SBSQ NF CARE MODERATE MDM 30: CPT | Performed by: NURSE PRACTITIONER

## 2017-11-29 NOTE — PROGRESS NOTES
[place] [facility]    Chief Complaint   Patient presents with     Nursing Home Acute       HPI:    Ena Rubio is a 81 year old  (1935), who is being seen today for an episodic care visit at [facility] .  HPI information obtained from: facility chart records and [facility] chart review.Today's concern is:    Toxic encephalopathy  Cognitive impairment  SLUMS of 16/30 at the facility. Able to have appropriate conversations.     Chronic pain syndrome  Depression with anxiety  Currently on methadone 15 mg BID, mirtazapine 15 mg at  and oxazepam 10 mg BID. Pain is controlled when medications are given at the time they are scheduled for. Resident and family state that medications are not given at time and she then develops elevated heart rates and blood pressure.     Benign essential hypertension  Has had high blood pressures up the 160's with heart rate in the 100-110's range. Denies chest pain or shortness of breath. Currently on lisinopril 20 mg in the a.m (previously 10 mg but was increased to 20 mg upon TCU admission).    Physical deconditioning  Chronic due to chronic pain syndrome and underlying medical conditions. Continues to work with PT/OT/SLP    BP Ranges: 144-164/80-93  HR Ranges:   O2 Ranges: 93-94  Weight: 11/20/17 124.2 lbs and 11/28/17 123.8 lbs    ALLERGIES: Compazine  Past Medical, Surgical, Family and Social History reviewed and updated in [facility].    Current Outpatient Prescriptions   Medication Sig Dispense Refill     lisinopril (PRINIVIL/ZESTRIL) 20 MG tablet Take 1 tablet (20 mg) by mouth 2 times daily Take on tablet in the morning and one tablet in the evening       ASPIRIN PO Take 81 mg by mouth daily       Acetaminophen (TYLENOL PO) Take 650 mg by mouth every 4 hours as needed for mild pain or fever       VITAMIN D, CHOLECALCIFEROL, PO Take 1,000 Units by mouth daily       polyethylene glycol (MIRALAX/GLYCOLAX) Packet Take 1 packet by mouth daily       DOCUSATE  "SODIUM PO Take 100 mg by mouth daily       simvastatin (ZOCOR) 40 MG tablet 20 mg At Bedtime        omeprazole (PRILOSEC) 20 MG capsule Take 20 mg by mouth 2 times daily        methadone (DOLOPHINE) 5 MG tablet Take 15 mg by mouth 2 times daily        gabapentin (NEURONTIN) 600 MG tablet Take 300 mg by mouth 3 times daily        mirtazapine (REMERON) 15 MG tablet Take 15 mg by mouth At Bedtime.       Multiple Vitamins-Minerals (PRESERVISION AREDS PO) Take  by mouth 2 times daily.       fish oil-omega-3 fatty acids (OMEGA 3) 1000 MG capsule Take 1 capsule by mouth daily        oxazepam (SERAX) 10 MG capsule Take 10 mg by mouth 2 times daily        [DISCONTINUED] lisinopril (PRINIVIL,ZESTRIL) 10 MG tablet Take 20 mg by mouth daily        Medications reviewed:  Medications reconciled to facility chart and changes were made to reflect current medications as identified as above med list. Below are the changes that were made:   Medications stopped since last EPIC medication reconciliation:   There are no discontinued medications.    Medications started since last [facility] medication reconciliation:  No orders of the defined types were placed in this encounter.      REVIEW OF SYSTEMS:  4 point ROS including Respiratory, CV, GI and , other than that noted in the HPI,  is negative    Physical Exam:  BP (!) 161/93  Pulse 110  Temp 98.4  F (36.9  C)  Resp 18  Ht 5' 6\" (1.676 m)  Wt 133 lb 12.8 oz (60.7 kg)  SpO2 94%  BMI 21.6 kg/m2  GENERAL APPEARANCE:  Alert, in no distress, pleasant, cooperative.  EYES:  EOM, lids, pupils and irises normal, sclera clear and conjunctiva normal, no discharge or mattering on lids or lashes noted  ENT:  Mouth normal, moist mucous membranes, nose without drainage or crusting, external ears without lesions, hearing acuity intact  RESP:  respiratory effort and palpation of chest normal, no chest wall tenderness, no respiratory distress, Lung sounds clear, patient is on room air  CV:  Palpation " and auscultation of heart done, tachycardia, regular rhythm, no murmur, no rub or gallop. Edema none   ABDOMEN:  normal bowel sounds, soft, non-tender.  M/S:   Gait and station wheelchair bound and unsafe without assistance, no tenderness or swelling of the joints; able to move all extremities  SKIN:  Inspection and palpation of skin and subcutaneous tissue: skin warm, dry. Wound to   NEURO: cranial nerves 2-12 grossly intact, no facial asymmetry, no speech deficits and able to follow directions, moves all extremities symmetrically  PSYCH:  insight and judgement impaired, memory impaired, affect and mood normal    Recent Labs:      CBC RESULTS:   Recent Labs   Lab Test  11/08/17   1832  10/02/14   1504   WBC  7.3  5.7   RBC  4.60  4.18   HGB  13.1  12.1   HCT  40.4  37.6   MCV  88  90   MCH  28.5  28.9   MCHC  32.4  32.2   RDW  15.2*  13.3   PLT  241  272       Last Basic Metabolic Panel:  Recent Labs   Lab Test 11/22/17 11/08/17   1832   NA  142  139   POTASSIUM  3.7  3.8   CHLORIDE  108  104   XIN  8.3*  8.7   CO2  26  31   BUN  12  10   CR  0.56  0.65   GLC  115*  84     Assessment/Plan:  (G92) Toxic encephalopathy  (primary encounter diagnosis)  (R41.89) Cognitive impairment  Comment: improving since admission SLUMS of 17/30 which is likely baseline and in need of LTC  Plan: continue with SLP therapy.     (G89.4) Chronic pain syndrome  Comment: chronic. She has not been receiving medication on time resulting with elevated heart rate and blood pressure due to pain/anxiety.  Plan: change morning medication administration time to 0600.    (I10) Benign essential hypertension  (R00.0) Tachycardia  Comment: chronic, elevated readings likely due to anxiety and hope will improve if receives medications early in the morning. Recent increase of lisinopril to 20 mg on admission.   Plan: Add lisinopril 20 mg at HS. Medication administration at 0600 from 0800.    (F41.8) Depression with anxiety  Comment: chronic. Pleasant,  upbeat mood today   Plan: continue with mirtazapine 15 mg at HS and oxazepam 10 mg BID.     (R53.81) Physical deconditioning  Comment: in the setting of underlying medical conditions.   Plan: continue with PT/OT/SLP        Electronically signed by  CLAIRE Wong CNP

## 2017-11-30 RX ORDER — LISINOPRIL 20 MG/1
20 TABLET ORAL 2 TIMES DAILY
COMMUNITY
Start: 2017-11-30

## 2017-12-05 ENCOUNTER — NURSING HOME VISIT (OUTPATIENT)
Dept: GERIATRICS | Facility: CLINIC | Age: 82
End: 2017-12-05
Payer: MEDICARE

## 2017-12-05 VITALS
HEART RATE: 107 BPM | DIASTOLIC BLOOD PRESSURE: 79 MMHG | RESPIRATION RATE: 18 BRPM | SYSTOLIC BLOOD PRESSURE: 157 MMHG | OXYGEN SATURATION: 94 % | TEMPERATURE: 98.5 F | HEIGHT: 66 IN | WEIGHT: 134.8 LBS | BODY MASS INDEX: 21.66 KG/M2

## 2017-12-05 DIAGNOSIS — R53.81 PHYSICAL DECONDITIONING: ICD-10-CM

## 2017-12-05 DIAGNOSIS — I10 BENIGN ESSENTIAL HYPERTENSION: Primary | ICD-10-CM

## 2017-12-05 DIAGNOSIS — F41.9 ANXIETY: ICD-10-CM

## 2017-12-05 DIAGNOSIS — G89.4 CHRONIC PAIN SYNDROME: ICD-10-CM

## 2017-12-05 PROCEDURE — 99309 SBSQ NF CARE MODERATE MDM 30: CPT | Performed by: NURSE PRACTITIONER

## 2017-12-06 NOTE — PROGRESS NOTES
[place] [facility]    Chief Complaint   Patient presents with     Nursing Home Acute       HPI:    Ena Rubio is a 81 year old  (1935), who is being seen today for an episodic care visit at [facility] .  HPI information obtained from: facility chart records, patient report and [facility] chart review.Today's concern is:  Benign essential hypertension  Currently on lisinopril 20 mg BID. She was admitted to this facility with lisinopril 10 mg daily. Blood pressure has consistently remained around 150's systolic despite the increase of lisinopril. Denies shortness of breath or chest pain.   BP Ranges: 140-155/79-93  HR Ranges:   O2 Ranges: 93-94  Weight: 11/28 135.8 lbs and 12/2 134.8 lbs    Chronic pain syndrome  Anxiety  Currently on methadone 15 mg BID and oboxazem 10 mg BID. She feels that her anxiety is still present. Last week morning medications were adjusted to 0600 from 0800 due to late morning administration. It improved anxiety and pain management but she had emesis this morning as the medications were administered on an empty stomach. She has requested that the timing of her medications be switched back to 0800.    Physical deconditioning  Continues to work with PT/OT/SLP with a LCD of 12/18. Family is looking at an enhanced assisted living facility in [place] and were hoping to discharge on 12/13-12/14.      ALLERGIES: Compazine  Past Medical, Surgical, Family and Social History reviewed and updated in [facility].    Current Outpatient Prescriptions   Medication Sig Dispense Refill     lisinopril (PRINIVIL/ZESTRIL) 20 MG tablet Take 1 tablet (20 mg) by mouth 2 times daily Take on tablet in the morning and one tablet in the evening       ASPIRIN PO Take 81 mg by mouth daily       Acetaminophen (TYLENOL PO) Take 650 mg by mouth every 4 hours as needed for mild pain or fever       VITAMIN D, CHOLECALCIFEROL, PO Take 1,000 Units by mouth daily       polyethylene glycol  "(MIRALAX/GLYCOLAX) Packet Take 1 packet by mouth daily       DOCUSATE SODIUM PO Take 100 mg by mouth daily       simvastatin (ZOCOR) 40 MG tablet 20 mg At Bedtime        omeprazole (PRILOSEC) 20 MG capsule Take 20 mg by mouth 2 times daily        methadone (DOLOPHINE) 5 MG tablet Take 15 mg by mouth 2 times daily        gabapentin (NEURONTIN) 600 MG tablet Take 300 mg by mouth 3 times daily        mirtazapine (REMERON) 15 MG tablet Take 15 mg by mouth At Bedtime.       Multiple Vitamins-Minerals (PRESERVISION AREDS PO) Take  by mouth 2 times daily.       fish oil-omega-3 fatty acids (OMEGA 3) 1000 MG capsule Take 1 capsule by mouth daily        oxazepam (SERAX) 10 MG capsule Take 10 mg by mouth 2 times daily        Medications reviewed:  Medications reconciled to facility chart and changes were made to reflect current medications as identified as above med list. Below are the changes that were made:   Medications stopped since last EPIC medication reconciliation:   There are no discontinued medications.    Medications started since last [facility] medication reconciliation:  No orders of the defined types were placed in this encounter.    REVIEW OF SYSTEMS:  4 point ROS including Respiratory, CV, GI and , other than that noted in the HPI,  is negative    Physical Exam:  /79  Pulse 107  Temp 98.5  F (36.9  C)  Resp 18  Ht 5' 6\" (1.676 m)  Wt 134 lb 12.8 oz (61.1 kg)  SpO2 94%  BMI 21.76 kg/m2  GENERAL APPEARANCE:  Alert, in no distress, pleasant, cooperative.  EYES:  EOM, lids, pupils and irises normal, sclera clear and conjunctiva normal, no discharge or mattering on lids or lashes noted  ENT:  Mouth normal, moist mucous membranes, nose without drainage or crusting, external ears without lesions, hearing acuity intact  RESP:  respiratory effort and palpation of chest normal, no chest wall tenderness, no respiratory distress, Lung sounds clear, patient is on room air  CV:  Palpation and auscultation of heart " done, regular rate and rhythm, no murmur, no rub or gallop. Edema none   ABDOMEN:  normal bowel sounds, soft, non-tender.  M/S:   Gait and station wheelchair bound and unsafe without assistance, no tenderness or swelling of the joints; able to move all extremities  SKIN:  Inspection and palpation of skin and subcutaneous tissue: skin warm, dry. Wound to left heel covered.   NEURO: cranial nerves 2-12 grossly intact, no facial asymmetry, no speech deficits and able to follow directions, moves all extremities symmetrically  PSYCH:  insight and judgement impaired, memory slightly impaired, affect and mood normal    Recent Labs:      CBC RESULTS:   Recent Labs   Lab Test  11/08/17   1832  10/02/14   1504   WBC  7.3  5.7   RBC  4.60  4.18   HGB  13.1  12.1   HCT  40.4  37.6   MCV  88  90   MCH  28.5  28.9   MCHC  32.4  32.2   RDW  15.2*  13.3   PLT  241  272       Last Basic Metabolic Panel:  Recent Labs   Lab Test 11/22/17 11/08/17   1832   NA  142  139   POTASSIUM  3.7  3.8   CHLORIDE  108  104   XIN  8.3*  8.7   CO2  26  31   BUN  12  10   CR  0.56  0.65   GLC  115*  84       Assessment/Plan:  (I10) Benign essential hypertension  (primary encounter diagnosis)  Comment: chronic, uncontrolled readings which may have a component of pain and anxiety, resident agrees and would not like medications added to regimen at this time. Suspect that bp will decrease after discharge when settled into a new apartment.   Plan: continue with lisinopril 20 mg BID. Monitor blood pressures    (G89.4) Chronic pain syndrome  (F41.9) Anxiety  Comment: chronic, slightly controlled at the facility but may have increased anxiety due to being at a new facility and not receiving medications upon rising in the morning.   Plan: continue with methadone 15 mg BID and oxazepam 10 mg BID    (R53.81) Physical deconditioning  Comment: due to neuropathy. Continues with therapy but may discharge next week.   Plan: continue with PT/OT/SLP until discharge.        Electronically signed by  CLAIRE Wong CNP

## 2017-12-11 VITALS
RESPIRATION RATE: 18 BRPM | HEIGHT: 66 IN | DIASTOLIC BLOOD PRESSURE: 57 MMHG | WEIGHT: 132.9 LBS | OXYGEN SATURATION: 91 % | BODY MASS INDEX: 21.36 KG/M2 | HEART RATE: 65 BPM | SYSTOLIC BLOOD PRESSURE: 123 MMHG | TEMPERATURE: 98.3 F

## 2017-12-11 NOTE — PROGRESS NOTES
[facility] DISCHARGE SUMMARY    PATIENT'S NAME: Ena Rubio  YOB: 1935  MEDICAL RECORD NUMBER:  [id number]    PRIMARY CARE PROVIDER AND CLINIC RESPONSIBLE AFTER TRANSFER: [facility] [address] / [place]    CODE STATUS/ADVANCE DIRECTIVES DISCUSSION:   DNR / DNI       Allergies   Allergen Reactions     Compazine Anaphylaxis     Stopped breathing/seizure       TRANSFERRING PROVIDERS: CLAIRE Wong CNP, Dr. Lucita MD  DATE OF SNF ADMISSION:  November / 17 / 2017  DATE OF SNF (anticipated) DISCHARGE: December / 13 / 2017  DISCHARGE DISPOSITION: Assisted Living: [facility]   Nursing Facility: [facility]  stay 11/13/17 to 11/17/17.     Condition on Discharge:  Stable.  Function:  Self propels in wheelchair and ambulates with walker and assistance. Incontinent of bowel and bladder  Cognitive Scores: SLUMS 17/30    Equipment: wheelchair    DISCHARGE DIAGNOSIS:   1. Altered mental status, unspecified altered mental status type    2. Coronary artery disease involving native coronary artery of native heart without angina pectoris    3. Benign essential hypertension    4. Hereditary and idiopathic peripheral neuropathy    5. Bowel and bladder incontinence    6. Mixed hyperlipidemia    7. Chronic pain syndrome    8. Anxiety    9. Decubitus ulcer of left heel, unspecified ulcer stage    10. Gastroesophageal reflux disease without esophagitis    11. Cognitive impairment    12. Macular degeneration (senile) of retina    13. Physical deconditioning        HPI Nursing Facility Course:  HPI information obtained from: facility chart records, patient report and [facility] chart review.      HPI:    Ena Rubio is a 81 year old  (1935),admitted to the [facility] .  Hospital stay 11/13/17 through 11/17/17.  Admitted to this  "facility for  rehab, medical management and nursing care.  HPI information obtained from: facility chart records.       Ena Rubio is a 81 y.o. female with a medical history of CAD, chronic bowel and bladder incontinence, hyperlipidemia, idiopathic peripheral neuropathy, chronic pain, macular degeneration, and hx of MSSA bilateral feet ulcers. She was placed in a SNF on 11/1/17 as her spouse, primary care giver, was having surgery at the [facility]. Family noticed that she had decreased oral intake after admission to the facility and EMS was called on 11/8/17 for altered mental status. She was brought to [facility] for evaluation. Head CT negative, chest x-ray resulted with a scant opacity of right lung so she was discharged back to the care facility with a 10 day course of doxycycline, with mental status back to baseline.      She became increasingly weak at the facility and began having delusions so she was sent to [facility] on 11/13/17. Upon arrival she was hypertensive with systolic pressures up to 220's and heart rate of 128. She was given narcan and mental status improved for a short time only. Chest x-ray, CT head, abdomen/pelvis, and cervical spine all negative for infection. Of note, the NP that was caring for her at the prior facility reported to the ED physician that \"the patient had been found with a drug bottle of mixed medications. They were concerned that patient was hoarding her Benzos and narcotics and potentially overdosing\" so the NP was working to taper off methadone and oxazepam. She was seen by the pain service during admission and During admission she was treated with zosyn and vanco for possible pneumonia, both abx were stopped after blood cultures showed no growth. Mental status cleared after methadone and oxazepam doses were decreased. She was seen by the pain service who recommended to continue on the decreased doses of oxazepam 10 mg BID and methadone 15 mg BID. She was also evaluated by " psychiatry who noted that Ms. Rubio may have worsening cognitive decline and recommended further cognitive evaluation as an outpatient. She was admitted to this facility for PT/OT and medical management.     Toxic Encephalopathy-RESOLVED  Cognitive Impairment  In setting of likely overdose of methadone and benzos (she had a bottle of mixed home meds in her possession at SNF). Concern by psychiatry of underlying cognitive decline. SLUMS 17/30 at [facility]. Moving to an enhanced assisted living.  -- Continue with OT at assisted living    Chronic Pain Syndrome  Follows with a pain clinic. Doses of methadone and oxazepam reduced during hosptialization.   -- continues on methadone 15 mg BID and oxazepam 10 mg BID   -- follow up with pain clinic as scheduled on 12/15/17 at 9:30 a.m.      CAD / HTN / HLD/Tachycardia  Blood pressures consistently in the systolic range of 150 while at the TCU. Lisinopril has been increased throughout the stay. Also developed tachycardia maintaining a heart rate of 110's--initially thought to be from anxiety and pain related as medications may have been administered later than 0800. Morning medications were changed to 0600 to ensure early administration but she developed nausea and vomiting due to taking medications on an empty stomach. Heart rate continued to be elevated so metoprolol tartrate 12.5 mg BID was started and heart rates now in the 60-75 range.   -- continues on ASA 81 mg daily, lisinopril 20 mg BID, metoprolol 12.5 mg BID,  and simvastatin 20 mg qhs    Depression / Anxiety  Evaluated by psychiatry in the hospital whom recommended titration off the benzo and possibly initiate an SSRI.  -- continues on mirtazapine 15 mg qhs  -- follow up with PCP for further medication management and initiation of SSRI.    Decubitus ulcer of left heel  Chronic. Has prevalon boots which she is rarely seen wearing during the day. She was followed by the in house wound MD while at the facility.    --continues with Prevalon boots while in bed and in wheelchair.    --Per wound MD: skin prep to left heel BID. Keep covered to protect from injury.     Idiopathic Peripheral Neuropathy  Pain controlled.   -- continues on gabapentin 300 mg TID     GERD  -- continues on omeprazole 20 mg BID     Drug Induced Constipation  -- continues on docusate 100 mg daily and Miralax 17g daily   -- adjust bowel regimen as needed     Physical Deconditioning  In setting of hospitalization and underlying medical conditions  --PT/OT at assisted living.     BP Ranges: 123-157/57-88  HR Ranges:   O2 Ranges: 91-94% on room air  Weight: 11/17 123 lbs and 12/6 132.9 lbs    PAST MEDICAL HISTORY:  has no past medical history on file.    DISCHARGE MEDICATIONS:  Current Outpatient Prescriptions   Medication Sig Dispense Refill     METOPROLOL TARTRATE PO Take 12.5 mg by mouth 2 times daily       lisinopril (PRINIVIL/ZESTRIL) 20 MG tablet Take 1 tablet (20 mg) by mouth 2 times daily Take on tablet in the morning and one tablet in the evening       ASPIRIN PO Take 81 mg by mouth daily       Acetaminophen (TYLENOL PO) Take 650 mg by mouth every 4 hours as needed for mild pain or fever       VITAMIN D, CHOLECALCIFEROL, PO Take 1,000 Units by mouth daily       polyethylene glycol (MIRALAX/GLYCOLAX) Packet Take 1 packet by mouth daily       DOCUSATE SODIUM PO Take 100 mg by mouth daily       simvastatin (ZOCOR) 40 MG tablet 20 mg At Bedtime        omeprazole (PRILOSEC) 20 MG capsule Take 20 mg by mouth 2 times daily        methadone (DOLOPHINE) 5 MG tablet Take 15 mg by mouth 2 times daily        gabapentin (NEURONTIN) 600 MG tablet Take 300 mg by mouth 3 times daily        mirtazapine (REMERON) 15 MG tablet Take 15 mg by mouth At Bedtime.       Multiple Vitamins-Minerals (PRESERVISION AREDS PO) Take  by mouth 2 times daily.       fish oil-omega-3 fatty acids (OMEGA 3) 1000 MG capsule Take 1 capsule by mouth daily        oxazepam (SERAX) 10 MG  "capsule Take 10 mg by mouth 2 times daily          MEDICATION CHANGES/RATIONALE:   Lisinopril increased to 20 mg BID due to hypertension  Metoprolol 12.5 mg BID added due to elevated heart rate.     Controlled medications sent with patient: Script for Methadone (5 mg and 10 mg tablets) medication for 60 tabs and 0 refills given to patient at dischage to have them fill at their out patient pharmacy and Script for Oxazepam 10 mg tabs medication for 60 tabs and 0 refills given to patient at dischage to have them fill at their out patient pharmacy     ROS:    10 point ROS of systems including Constitutional, Eyes, Respiratory, Cardiovascular, Gastroenterology, Genitourinary, Integumentary, Muscularskeletal, Psychiatric were all negative except for pertinent positives noted in my HPI.    Physical Exam:   Vitals: /57  Pulse 65  Temp 98.3  F (36.8  C)  Resp 18  Ht 5' 6\" (1.676 m)  Wt 132 lb 14.4 oz (60.3 kg)  SpO2 91%  BMI 21.45 kg/m2  BMI= Body mass index is 21.45 kg/(m^2).  GENERAL APPEARANCE:  Alert, in no distress, pleasant, cooperative.  EYES:  EOM, lids, pupils and irises normal, sclera clear and conjunctiva normal, no discharge or mattering on lids or lashes noted  ENT:  Mouth normal, moist mucous membranes, nose without drainage or crusting, external ears without lesions, hearing acuity intact  RESP:  respiratory effort and palpation of chest normal, no chest wall tenderness, no respiratory distress, Lung sounds clear, patient is on room air  CV:  Palpation and auscultation of heart done, rate and rhythm regular, no murmur, no rub or gallop. Edema none   ABDOMEN:  normal bowel sounds, soft, non-tender.  M/S:   Gait and station wheelchair bound and unsafe without assistance, no tenderness or swelling of the joints; able to move all extremities  SKIN:  Inspection and palpation of skin and subcutaneous tissue: skin warm, dry. Unable to visualize left heel ulcer.   NEURO: cranial nerves 2-12 grossly " intact, no facial asymmetry, no speech deficits and able to follow directions, moves all extremities symmetrically  PSYCH:  insight and judgement impaired, memory impaired, affect and mood normal    DISCHARGE PLAN:  Occupational Therapy and Physical Therapy  Patient instructed to follow-up with:  PCP in 7 days      Current [place] scheduled appointments:  Future Appointments  Date Time Provider Department Center   12/12/2017 12:00 PM Areli Weiner APRN CNP [facility] [place] [facility]       Pending labs: NONE  SNF labs     CBC RESULTS:   Recent Labs   Lab Test  11/08/17   1832  10/02/14   1504   WBC  7.3  5.7   RBC  4.60  4.18   HGB  13.1  12.1   HCT  40.4  37.6   MCV  88  90   MCH  28.5  28.9   MCHC  32.4  32.2   RDW  15.2*  13.3   PLT  241  272       Last Basic Metabolic Panel:  Recent Labs   Lab Test 11/22/17 11/08/17   1832   NA  142  139   POTASSIUM  3.7  3.8   CHLORIDE  108  104   XIN  8.3*  8.7   CO2  26  31   BUN  12  10   CR  0.56  0.65   GLC  115*  84       Discharge Treatments: NONE    TOTAL DISCHARGE TIME:   Greater than 30 minutes  Electronically signed by:  CLAIRE Wong CNP

## 2017-12-12 ENCOUNTER — NURSING HOME VISIT (OUTPATIENT)
Dept: GERIATRICS | Facility: CLINIC | Age: 82
End: 2017-12-12
Payer: MEDICARE

## 2017-12-12 DIAGNOSIS — F41.9 ANXIETY: ICD-10-CM

## 2017-12-12 DIAGNOSIS — K21.9 GASTROESOPHAGEAL REFLUX DISEASE WITHOUT ESOPHAGITIS: ICD-10-CM

## 2017-12-12 DIAGNOSIS — L89.629 DECUBITUS ULCER OF LEFT HEEL, UNSPECIFIED ULCER STAGE: ICD-10-CM

## 2017-12-12 DIAGNOSIS — E78.2 MIXED HYPERLIPIDEMIA: ICD-10-CM

## 2017-12-12 DIAGNOSIS — R41.82 ALTERED MENTAL STATUS, UNSPECIFIED ALTERED MENTAL STATUS TYPE: Primary | ICD-10-CM

## 2017-12-12 DIAGNOSIS — I10 BENIGN ESSENTIAL HYPERTENSION: ICD-10-CM

## 2017-12-12 DIAGNOSIS — R53.81 PHYSICAL DECONDITIONING: ICD-10-CM

## 2017-12-12 DIAGNOSIS — I25.10 CORONARY ARTERY DISEASE INVOLVING NATIVE CORONARY ARTERY OF NATIVE HEART WITHOUT ANGINA PECTORIS: ICD-10-CM

## 2017-12-12 DIAGNOSIS — G89.4 CHRONIC PAIN SYNDROME: ICD-10-CM

## 2017-12-12 DIAGNOSIS — R15.9 BOWEL AND BLADDER INCONTINENCE: ICD-10-CM

## 2017-12-12 DIAGNOSIS — R41.89 COGNITIVE IMPAIRMENT: ICD-10-CM

## 2017-12-12 DIAGNOSIS — G60.9 HEREDITARY AND IDIOPATHIC PERIPHERAL NEUROPATHY: ICD-10-CM

## 2017-12-12 DIAGNOSIS — R32 BOWEL AND BLADDER INCONTINENCE: ICD-10-CM

## 2017-12-12 PROCEDURE — 99316 NF DSCHRG MGMT 30 MIN+: CPT | Performed by: NURSE PRACTITIONER

## 2019-06-17 PROBLEM — L89.629 PRESSURE INJURY OF SKIN OF LEFT HEEL: Status: ACTIVE | Noted: 2017-11-22

## 2021-05-31 ENCOUNTER — RECORDS - HEALTHEAST (OUTPATIENT)
Dept: ADMINISTRATIVE | Facility: CLINIC | Age: 86
End: 2021-05-31